# Patient Record
Sex: MALE | NOT HISPANIC OR LATINO | Employment: FULL TIME | URBAN - METROPOLITAN AREA
[De-identification: names, ages, dates, MRNs, and addresses within clinical notes are randomized per-mention and may not be internally consistent; named-entity substitution may affect disease eponyms.]

---

## 2019-08-20 ENCOUNTER — HOSPITAL ENCOUNTER (OUTPATIENT)
Facility: MEDICAL CENTER | Age: 29
End: 2019-08-22
Attending: EMERGENCY MEDICINE | Admitting: INTERNAL MEDICINE
Payer: COMMERCIAL

## 2019-08-20 ENCOUNTER — APPOINTMENT (OUTPATIENT)
Dept: RADIOLOGY | Facility: MEDICAL CENTER | Age: 29
End: 2019-08-20
Attending: EMERGENCY MEDICINE
Payer: COMMERCIAL

## 2019-08-20 DIAGNOSIS — R06.09 DOE (DYSPNEA ON EXERTION): ICD-10-CM

## 2019-08-20 DIAGNOSIS — R07.9 CHEST PAIN WITH MODERATE RISK FOR CARDIAC ETIOLOGY: ICD-10-CM

## 2019-08-20 PROBLEM — E11.9 DIABETES (HCC): Status: ACTIVE | Noted: 2019-08-20

## 2019-08-20 PROBLEM — I10 HYPERTENSION: Status: ACTIVE | Noted: 2019-08-20

## 2019-08-20 PROBLEM — J45.909 ASTHMA: Status: ACTIVE | Noted: 2019-08-20

## 2019-08-20 PROBLEM — E78.00 HIGH CHOLESTEROL: Status: ACTIVE | Noted: 2019-08-20

## 2019-08-20 LAB
ALBUMIN SERPL BCP-MCNC: 4.4 G/DL (ref 3.2–4.9)
ALBUMIN/GLOB SERPL: 1.5 G/DL
ALP SERPL-CCNC: 42 U/L (ref 30–99)
ALT SERPL-CCNC: 19 U/L (ref 2–50)
ANION GAP SERPL CALC-SCNC: 10 MMOL/L (ref 0–11.9)
AST SERPL-CCNC: 17 U/L (ref 12–45)
BASOPHILS # BLD AUTO: 0.4 % (ref 0–1.8)
BASOPHILS # BLD: 0.03 K/UL (ref 0–0.12)
BILIRUB SERPL-MCNC: 0.5 MG/DL (ref 0.1–1.5)
BUN SERPL-MCNC: 15 MG/DL (ref 8–22)
CALCIUM SERPL-MCNC: 9.7 MG/DL (ref 8.5–10.5)
CHLORIDE SERPL-SCNC: 104 MMOL/L (ref 96–112)
CO2 SERPL-SCNC: 23 MMOL/L (ref 20–33)
CREAT SERPL-MCNC: 0.99 MG/DL (ref 0.5–1.4)
EKG IMPRESSION: NORMAL
EOSINOPHIL # BLD AUTO: 0.05 K/UL (ref 0–0.51)
EOSINOPHIL NFR BLD: 0.6 % (ref 0–6.9)
ERYTHROCYTE [DISTWIDTH] IN BLOOD BY AUTOMATED COUNT: 41 FL (ref 35.9–50)
GLOBULIN SER CALC-MCNC: 2.9 G/DL (ref 1.9–3.5)
GLUCOSE BLD-MCNC: 108 MG/DL (ref 65–99)
GLUCOSE BLD-MCNC: 123 MG/DL (ref 65–99)
GLUCOSE SERPL-MCNC: 113 MG/DL (ref 65–99)
HCT VFR BLD AUTO: 47.5 % (ref 42–52)
HGB BLD-MCNC: 15.9 G/DL (ref 14–18)
IMM GRANULOCYTES # BLD AUTO: 0.02 K/UL (ref 0–0.11)
IMM GRANULOCYTES NFR BLD AUTO: 0.2 % (ref 0–0.9)
LYMPHOCYTES # BLD AUTO: 2.83 K/UL (ref 1–4.8)
LYMPHOCYTES NFR BLD: 33.5 % (ref 22–41)
MCH RBC QN AUTO: 31.1 PG (ref 27–33)
MCHC RBC AUTO-ENTMCNC: 33.5 G/DL (ref 33.7–35.3)
MCV RBC AUTO: 92.8 FL (ref 81.4–97.8)
MONOCYTES # BLD AUTO: 0.37 K/UL (ref 0–0.85)
MONOCYTES NFR BLD AUTO: 4.4 % (ref 0–13.4)
NEUTROPHILS # BLD AUTO: 5.14 K/UL (ref 1.82–7.42)
NEUTROPHILS NFR BLD: 60.9 % (ref 44–72)
NRBC # BLD AUTO: 0 K/UL
NRBC BLD-RTO: 0 /100 WBC
PLATELET # BLD AUTO: 210 K/UL (ref 164–446)
PMV BLD AUTO: 10.2 FL (ref 9–12.9)
POTASSIUM SERPL-SCNC: 3.8 MMOL/L (ref 3.6–5.5)
PROT SERPL-MCNC: 7.3 G/DL (ref 6–8.2)
RBC # BLD AUTO: 5.12 M/UL (ref 4.7–6.1)
SODIUM SERPL-SCNC: 137 MMOL/L (ref 135–145)
TROPONIN T SERPL-MCNC: <6 NG/L (ref 6–19)
WBC # BLD AUTO: 8.4 K/UL (ref 4.8–10.8)

## 2019-08-20 PROCEDURE — G0378 HOSPITAL OBSERVATION PER HR: HCPCS

## 2019-08-20 PROCEDURE — 71045 X-RAY EXAM CHEST 1 VIEW: CPT

## 2019-08-20 PROCEDURE — 99285 EMERGENCY DEPT VISIT HI MDM: CPT

## 2019-08-20 PROCEDURE — 93005 ELECTROCARDIOGRAM TRACING: CPT | Performed by: EMERGENCY MEDICINE

## 2019-08-20 PROCEDURE — 80053 COMPREHEN METABOLIC PANEL: CPT

## 2019-08-20 PROCEDURE — 85025 COMPLETE CBC W/AUTO DIFF WBC: CPT

## 2019-08-20 PROCEDURE — 93005 ELECTROCARDIOGRAM TRACING: CPT

## 2019-08-20 PROCEDURE — 82962 GLUCOSE BLOOD TEST: CPT | Mod: 91

## 2019-08-20 PROCEDURE — 99223 1ST HOSP IP/OBS HIGH 75: CPT | Performed by: INTERNAL MEDICINE

## 2019-08-20 PROCEDURE — 84484 ASSAY OF TROPONIN QUANT: CPT

## 2019-08-20 PROCEDURE — 700102 HCHG RX REV CODE 250 W/ 637 OVERRIDE(OP): Performed by: INTERNAL MEDICINE

## 2019-08-20 PROCEDURE — A9270 NON-COVERED ITEM OR SERVICE: HCPCS | Performed by: INTERNAL MEDICINE

## 2019-08-20 RX ORDER — ENALAPRILAT 1.25 MG/ML
1.25 INJECTION INTRAVENOUS EVERY 6 HOURS PRN
Status: DISCONTINUED | OUTPATIENT
Start: 2019-08-20 | End: 2019-08-22 | Stop reason: HOSPADM

## 2019-08-20 RX ORDER — LISINOPRIL 2.5 MG/1
2.5 TABLET ORAL DAILY
Status: DISCONTINUED | OUTPATIENT
Start: 2019-08-21 | End: 2019-08-22 | Stop reason: HOSPADM

## 2019-08-20 RX ORDER — ATORVASTATIN CALCIUM 10 MG/1
10 TABLET, FILM COATED ORAL DAILY
COMMUNITY

## 2019-08-20 RX ORDER — FLUTICASONE PROPIONATE 110 UG/1
2 AEROSOL, METERED RESPIRATORY (INHALATION) 2 TIMES DAILY
COMMUNITY
End: 2019-11-05

## 2019-08-20 RX ORDER — LISINOPRIL 2.5 MG/1
2.5 TABLET ORAL EVERY MORNING
COMMUNITY

## 2019-08-20 RX ORDER — BISACODYL 10 MG
10 SUPPOSITORY, RECTAL RECTAL
Status: DISCONTINUED | OUTPATIENT
Start: 2019-08-20 | End: 2019-08-22 | Stop reason: HOSPADM

## 2019-08-20 RX ORDER — AMOXICILLIN 250 MG
2 CAPSULE ORAL 2 TIMES DAILY
Status: DISCONTINUED | OUTPATIENT
Start: 2019-08-20 | End: 2019-08-22 | Stop reason: HOSPADM

## 2019-08-20 RX ORDER — ALOGLIPTIN 25 MG/1
25 TABLET, FILM COATED ORAL EVERY MORNING
COMMUNITY

## 2019-08-20 RX ORDER — PIOGLITAZONEHYDROCHLORIDE 45 MG/1
45 TABLET ORAL EVERY MORNING
COMMUNITY

## 2019-08-20 RX ORDER — POLYETHYLENE GLYCOL 3350 17 G/17G
1 POWDER, FOR SOLUTION ORAL
Status: DISCONTINUED | OUTPATIENT
Start: 2019-08-20 | End: 2019-08-22 | Stop reason: HOSPADM

## 2019-08-20 RX ORDER — ACETAMINOPHEN 325 MG/1
650 TABLET ORAL EVERY 6 HOURS PRN
Status: DISCONTINUED | OUTPATIENT
Start: 2019-08-20 | End: 2019-08-22 | Stop reason: HOSPADM

## 2019-08-20 RX ORDER — ALBUTEROL SULFATE 90 UG/1
2 AEROSOL, METERED RESPIRATORY (INHALATION) EVERY 4 HOURS PRN
Status: ON HOLD | COMMUNITY
End: 2019-08-22

## 2019-08-20 RX ORDER — ATORVASTATIN CALCIUM 10 MG/1
10 TABLET, FILM COATED ORAL DAILY
Status: DISCONTINUED | OUTPATIENT
Start: 2019-08-21 | End: 2019-08-22 | Stop reason: HOSPADM

## 2019-08-20 RX ADMIN — ACETAMINOPHEN 650 MG: 325 TABLET ORAL at 23:14

## 2019-08-20 SDOH — HEALTH STABILITY: MENTAL HEALTH: HOW OFTEN DO YOU HAVE A DRINK CONTAINING ALCOHOL?: 2-4 TIMES A MONTH

## 2019-08-20 ASSESSMENT — ENCOUNTER SYMPTOMS
PALPITATIONS: 0
FALLS: 0
CHILLS: 0
WEAKNESS: 0
SHORTNESS OF BREATH: 1
TINGLING: 0
SPUTUM PRODUCTION: 0
MYALGIAS: 0
DIZZINESS: 0
HEADACHES: 1
NAUSEA: 0
CONSTIPATION: 0
FEVER: 0
STRIDOR: 0
DEPRESSION: 0
LOSS OF CONSCIOUSNESS: 0
VOMITING: 0
ABDOMINAL PAIN: 0
COUGH: 1
DIARRHEA: 0

## 2019-08-20 ASSESSMENT — LIFESTYLE VARIABLES
ON A TYPICAL DAY WHEN YOU DRINK ALCOHOL HOW MANY DRINKS DO YOU HAVE: 0
ALCOHOL_USE: NO
HAVE YOU EVER FELT YOU SHOULD CUT DOWN ON YOUR DRINKING: NO
HAVE PEOPLE ANNOYED YOU BY CRITICIZING YOUR DRINKING: NO
CONSUMPTION TOTAL: NEGATIVE
DOES PATIENT WANT TO STOP DRINKING: NO
TOTAL SCORE: 0
EVER FELT BAD OR GUILTY ABOUT YOUR DRINKING: NO
TOTAL SCORE: 0
EVER HAD A DRINK FIRST THING IN THE MORNING TO STEADY YOUR NERVES TO GET RID OF A HANGOVER: NO
HOW MANY TIMES IN THE PAST YEAR HAVE YOU HAD 5 OR MORE DRINKS IN A DAY: 0
TOTAL SCORE: 0
EVER_SMOKED: NEVER
AVERAGE NUMBER OF DAYS PER WEEK YOU HAVE A DRINK CONTAINING ALCOHOL: 0

## 2019-08-20 ASSESSMENT — PATIENT HEALTH QUESTIONNAIRE - PHQ9
SUM OF ALL RESPONSES TO PHQ9 QUESTIONS 1 AND 2: 0
2. FEELING DOWN, DEPRESSED, IRRITABLE, OR HOPELESS: NOT AT ALL
1. LITTLE INTEREST OR PLEASURE IN DOING THINGS: NOT AT ALL

## 2019-08-20 NOTE — ED TRIAGE NOTES
"Chief Complaint   Patient presents with   • Epigastric Pain     radiating throughout chest and down LT arm x4 days with SOB.    • Shortness of Breath     worse with exertion. hx of asthma, getting tested next week for gastroparesis.      Pt to triage for above. Pt mildly anxious. EKG completed. NAD noted.   Protocol ordered.     Pt returned to [facility]. Educated on triage process and to inform staff of any changes.     /76   Pulse 92   Temp 36.6 °C (97.9 °F) (Temporal)   Resp 16   Ht 1.753 m (5' 9\")   Wt 106.6 kg (235 lb 0.2 oz)   SpO2 95%   BMI 34.71 kg/m²     "

## 2019-08-20 NOTE — ED NOTES
Pt to desk feeling more discomfort and chest pain. vitaled and placed on 2 liters of 02 for comfort.

## 2019-08-21 ENCOUNTER — APPOINTMENT (OUTPATIENT)
Dept: RADIOLOGY | Facility: MEDICAL CENTER | Age: 29
End: 2019-08-21
Attending: NURSE PRACTITIONER
Payer: COMMERCIAL

## 2019-08-21 ENCOUNTER — PATIENT OUTREACH (OUTPATIENT)
Dept: HEALTH INFORMATION MANAGEMENT | Facility: OTHER | Age: 29
End: 2019-08-21

## 2019-08-21 ENCOUNTER — APPOINTMENT (OUTPATIENT)
Dept: CARDIOLOGY | Facility: MEDICAL CENTER | Age: 29
End: 2019-08-21
Attending: INTERNAL MEDICINE
Payer: COMMERCIAL

## 2019-08-21 PROBLEM — E78.5 DYSLIPIDEMIA: Status: ACTIVE | Noted: 2019-08-20

## 2019-08-21 PROBLEM — E11.9 DIABETES (HCC): Chronic | Status: ACTIVE | Noted: 2019-08-20

## 2019-08-21 LAB
ANION GAP SERPL CALC-SCNC: 10 MMOL/L (ref 0–11.9)
BUN SERPL-MCNC: 15 MG/DL (ref 8–22)
CALCIUM SERPL-MCNC: 9.5 MG/DL (ref 8.5–10.5)
CHLORIDE SERPL-SCNC: 105 MMOL/L (ref 96–112)
CO2 SERPL-SCNC: 22 MMOL/L (ref 20–33)
CREAT SERPL-MCNC: 1.03 MG/DL (ref 0.5–1.4)
D DIMER PPP IA.FEU-MCNC: <0.4 UG/ML (FEU) (ref 0–0.5)
ERYTHROCYTE [DISTWIDTH] IN BLOOD BY AUTOMATED COUNT: 41.1 FL (ref 35.9–50)
GLUCOSE BLD-MCNC: 142 MG/DL (ref 65–99)
GLUCOSE BLD-MCNC: 218 MG/DL (ref 65–99)
GLUCOSE BLD-MCNC: 237 MG/DL (ref 65–99)
GLUCOSE BLD-MCNC: 308 MG/DL (ref 65–99)
GLUCOSE SERPL-MCNC: 216 MG/DL (ref 65–99)
HCT VFR BLD AUTO: 44.9 % (ref 42–52)
HGB BLD-MCNC: 15.2 G/DL (ref 14–18)
LV EJECT FRACT  99904: 60
LV EJECT FRACT MOD 2C 99903: 62.88
LV EJECT FRACT MOD 4C 99902: 70.17
LV EJECT FRACT MOD BP 99901: 69.56
MCH RBC QN AUTO: 31.6 PG (ref 27–33)
MCHC RBC AUTO-ENTMCNC: 33.9 G/DL (ref 33.7–35.3)
MCV RBC AUTO: 93.3 FL (ref 81.4–97.8)
PLATELET # BLD AUTO: 207 K/UL (ref 164–446)
PMV BLD AUTO: 10.3 FL (ref 9–12.9)
POTASSIUM SERPL-SCNC: 3.7 MMOL/L (ref 3.6–5.5)
RBC # BLD AUTO: 4.81 M/UL (ref 4.7–6.1)
SODIUM SERPL-SCNC: 137 MMOL/L (ref 135–145)
TROPONIN T SERPL-MCNC: <6 NG/L (ref 6–19)
WBC # BLD AUTO: 8.4 K/UL (ref 4.8–10.8)

## 2019-08-21 PROCEDURE — 700102 HCHG RX REV CODE 250 W/ 637 OVERRIDE(OP): Performed by: STUDENT IN AN ORGANIZED HEALTH CARE EDUCATION/TRAINING PROGRAM

## 2019-08-21 PROCEDURE — 36415 COLL VENOUS BLD VENIPUNCTURE: CPT

## 2019-08-21 PROCEDURE — A9270 NON-COVERED ITEM OR SERVICE: HCPCS | Performed by: INTERNAL MEDICINE

## 2019-08-21 PROCEDURE — 84484 ASSAY OF TROPONIN QUANT: CPT | Mod: 91

## 2019-08-21 PROCEDURE — 85027 COMPLETE CBC AUTOMATED: CPT

## 2019-08-21 PROCEDURE — 700117 HCHG RX CONTRAST REV CODE 255: Performed by: NURSE PRACTITIONER

## 2019-08-21 PROCEDURE — 93306 TTE W/DOPPLER COMPLETE: CPT | Mod: 26 | Performed by: INTERNAL MEDICINE

## 2019-08-21 PROCEDURE — G0378 HOSPITAL OBSERVATION PER HR: HCPCS

## 2019-08-21 PROCEDURE — 85379 FIBRIN DEGRADATION QUANT: CPT

## 2019-08-21 PROCEDURE — 700102 HCHG RX REV CODE 250 W/ 637 OVERRIDE(OP): Performed by: NURSE PRACTITIONER

## 2019-08-21 PROCEDURE — 96374 THER/PROPH/DIAG INJ IV PUSH: CPT

## 2019-08-21 PROCEDURE — 700111 HCHG RX REV CODE 636 W/ 250 OVERRIDE (IP): Performed by: INTERNAL MEDICINE

## 2019-08-21 PROCEDURE — 306588 SLEEVE,VASO CALF MED: Performed by: INTERNAL MEDICINE

## 2019-08-21 PROCEDURE — 82962 GLUCOSE BLOOD TEST: CPT | Mod: 91

## 2019-08-21 PROCEDURE — 700102 HCHG RX REV CODE 250 W/ 637 OVERRIDE(OP): Performed by: INTERNAL MEDICINE

## 2019-08-21 PROCEDURE — A9270 NON-COVERED ITEM OR SERVICE: HCPCS

## 2019-08-21 PROCEDURE — 99233 SBSQ HOSP IP/OBS HIGH 50: CPT | Performed by: INTERNAL MEDICINE

## 2019-08-21 PROCEDURE — A9270 NON-COVERED ITEM OR SERVICE: HCPCS | Performed by: NURSE PRACTITIONER

## 2019-08-21 PROCEDURE — 96372 THER/PROPH/DIAG INJ SC/IM: CPT

## 2019-08-21 PROCEDURE — 93010 ELECTROCARDIOGRAM REPORT: CPT | Mod: 76 | Performed by: INTERNAL MEDICINE

## 2019-08-21 PROCEDURE — 93005 ELECTROCARDIOGRAM TRACING: CPT | Performed by: INTERNAL MEDICINE

## 2019-08-21 PROCEDURE — 94640 AIRWAY INHALATION TREATMENT: CPT

## 2019-08-21 PROCEDURE — 700102 HCHG RX REV CODE 250 W/ 637 OVERRIDE(OP)

## 2019-08-21 PROCEDURE — 93010 ELECTROCARDIOGRAM REPORT: CPT | Performed by: INTERNAL MEDICINE

## 2019-08-21 PROCEDURE — 99253 IP/OBS CNSLTJ NEW/EST LOW 45: CPT | Mod: GC | Performed by: INTERNAL MEDICINE

## 2019-08-21 PROCEDURE — 93306 TTE W/DOPPLER COMPLETE: CPT

## 2019-08-21 PROCEDURE — 700111 HCHG RX REV CODE 636 W/ 250 OVERRIDE (IP): Performed by: NURSE PRACTITIONER

## 2019-08-21 PROCEDURE — 700101 HCHG RX REV CODE 250: Performed by: NURSE PRACTITIONER

## 2019-08-21 PROCEDURE — 71275 CT ANGIOGRAPHY CHEST: CPT

## 2019-08-21 PROCEDURE — A9270 NON-COVERED ITEM OR SERVICE: HCPCS | Performed by: STUDENT IN AN ORGANIZED HEALTH CARE EDUCATION/TRAINING PROGRAM

## 2019-08-21 PROCEDURE — 80048 BASIC METABOLIC PNL TOTAL CA: CPT

## 2019-08-21 RX ORDER — PREDNISONE 10 MG/1
10 TABLET ORAL DAILY
Status: DISCONTINUED | OUTPATIENT
Start: 2019-08-30 | End: 2019-08-22 | Stop reason: HOSPADM

## 2019-08-21 RX ORDER — PREDNISONE 10 MG/1
5 TABLET ORAL DAILY
Status: DISCONTINUED | OUTPATIENT
Start: 2019-09-02 | End: 2019-08-22 | Stop reason: HOSPADM

## 2019-08-21 RX ORDER — MONTELUKAST SODIUM 10 MG/1
10 TABLET ORAL DAILY
Status: DISCONTINUED | OUTPATIENT
Start: 2019-08-21 | End: 2019-08-22 | Stop reason: HOSPADM

## 2019-08-21 RX ORDER — CETIRIZINE HYDROCHLORIDE 10 MG/1
10 TABLET ORAL DAILY
Status: DISCONTINUED | OUTPATIENT
Start: 2019-08-21 | End: 2019-08-22 | Stop reason: HOSPADM

## 2019-08-21 RX ORDER — BUDESONIDE AND FORMOTEROL FUMARATE DIHYDRATE 160; 4.5 UG/1; UG/1
2 AEROSOL RESPIRATORY (INHALATION)
Status: DISCONTINUED | OUTPATIENT
Start: 2019-08-21 | End: 2019-08-22 | Stop reason: HOSPADM

## 2019-08-21 RX ORDER — NITROGLYCERIN 0.4 MG/1
TABLET SUBLINGUAL
Status: COMPLETED
Start: 2019-08-21 | End: 2019-08-21

## 2019-08-21 RX ORDER — MORPHINE SULFATE 4 MG/ML
1-4 INJECTION, SOLUTION INTRAMUSCULAR; INTRAVENOUS EVERY 4 HOURS PRN
Status: DISCONTINUED | OUTPATIENT
Start: 2019-08-21 | End: 2019-08-22 | Stop reason: HOSPADM

## 2019-08-21 RX ORDER — AZITHROMYCIN 250 MG/1
500 TABLET, FILM COATED ORAL DAILY
Status: DISCONTINUED | OUTPATIENT
Start: 2019-08-21 | End: 2019-08-22 | Stop reason: HOSPADM

## 2019-08-21 RX ORDER — FAMOTIDINE 20 MG/1
20 TABLET, FILM COATED ORAL 2 TIMES DAILY
Status: DISCONTINUED | OUTPATIENT
Start: 2019-08-21 | End: 2019-08-22 | Stop reason: HOSPADM

## 2019-08-21 RX ORDER — PREDNISONE 20 MG/1
20 TABLET ORAL DAILY
Status: DISCONTINUED | OUTPATIENT
Start: 2019-08-27 | End: 2019-08-22 | Stop reason: HOSPADM

## 2019-08-21 RX ORDER — PREDNISONE 20 MG/1
40 TABLET ORAL DAILY
Status: DISCONTINUED | OUTPATIENT
Start: 2019-08-21 | End: 2019-08-22 | Stop reason: HOSPADM

## 2019-08-21 RX ORDER — NITROGLYCERIN 0.4 MG/1
0.4 TABLET SUBLINGUAL ONCE
Status: COMPLETED | OUTPATIENT
Start: 2019-08-21 | End: 2019-08-21

## 2019-08-21 RX ORDER — ALBUTEROL SULFATE 90 UG/1
2 AEROSOL, METERED RESPIRATORY (INHALATION)
Status: DISCONTINUED | OUTPATIENT
Start: 2019-08-21 | End: 2019-08-22 | Stop reason: HOSPADM

## 2019-08-21 RX ADMIN — ALBUTEROL SULFATE 2.5 MG: 2.5 SOLUTION RESPIRATORY (INHALATION) at 10:17

## 2019-08-21 RX ADMIN — CETIRIZINE HYDROCHLORIDE 10 MG: 10 TABLET, FILM COATED ORAL at 11:49

## 2019-08-21 RX ADMIN — MONTELUKAST 10 MG: 10 TABLET, FILM COATED ORAL at 11:49

## 2019-08-21 RX ADMIN — PREDNISONE 40 MG: 20 TABLET ORAL at 11:49

## 2019-08-21 RX ADMIN — LISINOPRIL 2.5 MG: 2.5 TABLET ORAL at 06:59

## 2019-08-21 RX ADMIN — AZITHROMYCIN 500 MG: 250 TABLET, FILM COATED ORAL at 11:49

## 2019-08-21 RX ADMIN — MORPHINE SULFATE 2 MG: 4 INJECTION INTRAVENOUS at 07:15

## 2019-08-21 RX ADMIN — ALBUTEROL SULFATE 2.5 MG: 2.5 SOLUTION RESPIRATORY (INHALATION) at 15:59

## 2019-08-21 RX ADMIN — IOHEXOL 60 ML: 350 INJECTION, SOLUTION INTRAVENOUS at 13:15

## 2019-08-21 RX ADMIN — ATORVASTATIN CALCIUM 10 MG: 10 TABLET, FILM COATED ORAL at 06:59

## 2019-08-21 RX ADMIN — BUDESONIDE AND FORMOTEROL FUMARATE DIHYDRATE 2 PUFF: 160; 4.5 AEROSOL RESPIRATORY (INHALATION) at 17:30

## 2019-08-21 RX ADMIN — SENNOSIDES, DOCUSATE SODIUM 2 TABLET: 50; 8.6 TABLET, FILM COATED ORAL at 18:24

## 2019-08-21 RX ADMIN — NITROGLYCERIN 0.4 MG: 0.4 TABLET, ORALLY DISINTEGRATING SUBLINGUAL at 11:15

## 2019-08-21 RX ADMIN — INSULIN HUMAN 3 UNITS: 100 INJECTION, SOLUTION PARENTERAL at 21:31

## 2019-08-21 RX ADMIN — FAMOTIDINE 20 MG: 20 TABLET ORAL at 11:49

## 2019-08-21 RX ADMIN — ALBUTEROL SULFATE 2.5 MG: 2.5 SOLUTION RESPIRATORY (INHALATION) at 19:24

## 2019-08-21 RX ADMIN — INSULIN HUMAN 6 UNITS: 100 INJECTION, SOLUTION PARENTERAL at 18:29

## 2019-08-21 RX ADMIN — INSULIN HUMAN 3 UNITS: 100 INJECTION, SOLUTION PARENTERAL at 13:25

## 2019-08-21 RX ADMIN — FAMOTIDINE 20 MG: 20 TABLET ORAL at 18:24

## 2019-08-21 RX ADMIN — NITROGLYCERIN 0.4 MG: 0.4 TABLET SUBLINGUAL at 11:15

## 2019-08-21 ASSESSMENT — ENCOUNTER SYMPTOMS
VOMITING: 0
ORTHOPNEA: 0
COUGH: 0
SHORTNESS OF BREATH: 1
MYALGIAS: 0
MEMORY LOSS: 0
WHEEZING: 0
BLURRED VISION: 0
CONSTIPATION: 0
SPEECH CHANGE: 0
HEADACHES: 0
FALLS: 0
DOUBLE VISION: 0
PALPITATIONS: 0
NAUSEA: 0
NERVOUS/ANXIOUS: 0
SINUS PAIN: 0
CHILLS: 0
COUGH: 1
ABDOMINAL PAIN: 0
DIZZINESS: 1
SPUTUM PRODUCTION: 0
FEVER: 0
FOCAL WEAKNESS: 0
DIZZINESS: 0
DIARRHEA: 0
SORE THROAT: 0

## 2019-08-21 ASSESSMENT — LIFESTYLE VARIABLES: SUBSTANCE_ABUSE: 0

## 2019-08-21 NOTE — PROGRESS NOTES
Patient woke up with 8/10 sharp chest pain.  EKG and trop ordered.  MD Dr. Lu paged to update on patient status.  Waiting for call back.

## 2019-08-21 NOTE — ASSESSMENT & PLAN NOTE
-history of  -now with likely exacerbation with hypoxia requiring O2  -I've started him on steroid taper, Singulair, Zyrtec and Pepcid  -Lungs are without wheezes at baseline, however with activity he is noted to have upper airway/lobe expiratory wheezes  -Nebulizers every 4  -No acute exacerbation  -Pulmonary consulted - Dr. Morris

## 2019-08-21 NOTE — FLOWSHEET NOTE
Respiratory Therapy Update    Interdisciplinary Plan of Care-Goals (Indications)  Bronchodilator Indications: History / Diagnosis;Physical Exam / Hyperinflation / Wheezing (bronchospasm);Strong Subjective / Objective Improvement (08/21/19 1555)  Interdisciplinary Plan of Care-Outcomes   Bronchodilator Outcome: Diminished Wheezing and Volume of Air Movement Increased;Patient at Stable Baseline (08/21/19 1555)          #SVN Performed: Yes (08/21/19 1555)                                O2 (LPM): 2 (08/21/19 1555)  O2 Daily Delivery Respiratory : Silicone Nasal Cannula (08/21/19 1555)    Breath Sounds  Pre/Post Intervention: Pre Intervention Assessment (08/21/19 1555)  RUL Breath Sounds: Clear;Diminished (08/21/19 1555)  RML Breath Sounds: Diminished (08/21/19 1555)  RLL Breath Sounds: Diminished (08/21/19 1555)  CANDICE Breath Sounds: Diminished;Expiratory Wheezes (08/21/19 1555)  LLL Breath Sounds: Diminished (08/21/19 1555)        Events/Summary/Plan: SVN done in high fowlers (08/21/19 1555)

## 2019-08-21 NOTE — PROGRESS NOTES
Patient transferred to floor with ACLS RN and telemetry monitored. Patient is A/O x4 and co exertional lateral rib cage.

## 2019-08-21 NOTE — PROGRESS NOTES
"Hospital Medicine Daily Progress Note    Date of Service  8/21/2019    Chief Complaint  Chest pain and SOB    Hospital Course   This is a 29-year-old male with PMH diabetes diagnosed 6 years ago, hyperlipidemia and asthma who presented 8/20/2019 with shortness of breath.  This has been going on for \"a while now\", unsure exact duration but he has been seen at hospital in [place] multiple times and told to take his inhaler as needed, however he has not gotten any improvement.  Please see full H&P by Dr. Lu.  Troponin trended and were negative.  Initial twelve-lead EKG showed no acute changes, he had episode of chest pain morning after presentation which showed concern for right heart strain.  D-dimer was negative.  CTA was negative for PE.     Interval Problem Update  -[place] 2015 - stress test/ECHO - told he had MVP. ECHO here showed no acute findings.  -Episode of chest pain today.  Troponin negative.  EKG suspicious for right heart strain.  Negative d-dimer. CTA negative for PE.  Plan for nuclear medicine stress test  -Significant dyspnea with ambulation with associated dizziness.  He reports supplemental O2 is very effective for his symptoms.    Consultants/Specialty  -Pulmonary    Code Status  FULL    Disposition  TBD    Review of Systems  Review of Systems   Constitutional: Positive for malaise/fatigue.   Eyes: Negative for blurred vision and double vision.   Respiratory: Positive for cough and shortness of breath. Negative for sputum production and wheezing.    Cardiovascular: Positive for chest pain. Negative for palpitations and leg swelling.   Gastrointestinal: Negative for abdominal pain, constipation, diarrhea, nausea and vomiting.   Genitourinary: Negative for dysuria, frequency and urgency.   Musculoskeletal: Negative for falls, joint pain and myalgias.   Neurological: Positive for dizziness. Negative for speech change, focal weakness and headaches.   Psychiatric/Behavioral: Negative for memory " loss and substance abuse.   All other systems reviewed and are negative.       Physical Exam  Temp:  [36.1 °C (97 °F)-36.6 °C (97.9 °F)] 36.2 °C (97.1 °F)  Pulse:  [63-88] 72  Resp:  [17-20] 18  BP: (104-135)/(62-80) 135/79  SpO2:  [93 %-99 %] 96 %    Physical Exam   Constitutional: He is oriented to person, place, and time. Vital signs are normal. He appears well-developed and well-nourished. He is cooperative. No distress. Nasal cannula in place.   HENT:   Head: Normocephalic.   Right Ear: Hearing normal.   Left Ear: Hearing normal.   Nose: Nose normal.   Mouth/Throat: Oropharynx is clear and moist and mucous membranes are normal.   Eyes: Conjunctivae are normal. No scleral icterus.   Neck: Phonation normal. JVD: unable to assess due to body habitus.   Cardiovascular: Intact distal pulses. Exam reveals distant heart sounds.   No edema   Pulmonary/Chest: No apnea. He is in respiratory distress (with activity). He has decreased breath sounds.   Abdominal:   Obese, slightly distended, (+) BS   Musculoskeletal: Normal range of motion.   Neurological: He is alert and oriented to person, place, and time. He has normal strength. He displays a negative Romberg sign. GCS eye subscore is 4. GCS verbal subscore is 5. GCS motor subscore is 6.   Skin: Skin is warm and dry. No rash noted.   Psychiatric: He has a normal mood and affect. His behavior is normal. Judgment normal. Cognition and memory are normal.   Nursing note and vitals reviewed.      Fluids  No intake or output data in the 24 hours ending 08/21/19 1632    Laboratory  Recent Labs     08/20/19 2102 08/21/19  0037   WBC 8.4 8.4   RBC 5.12 4.81   HEMOGLOBIN 15.9 15.2   HEMATOCRIT 47.5 44.9   MCV 92.8 93.3   MCH 31.1 31.6   MCHC 33.5* 33.9   RDW 41.0 41.1   PLATELETCT 210 207   MPV 10.2 10.3     Recent Labs     08/20/19 2102 08/21/19  0037   SODIUM 137 137   POTASSIUM 3.8 3.7   CHLORIDE 104 105   CO2 23 22   GLUCOSE 113* 216*   BUN 15 15   CREATININE 0.99 1.03    CALCIUM 9.7 9.5                   Imaging  CT-CTA CHEST PULMONARY ARTERY W/ RECONS   Final Result      No evidence of pulmonary embolus            EC-ECHOCARDIOGRAM COMPLETE W/O CONT   Final Result      DX-CHEST-PORTABLE (1 VIEW)   Final Result         1. No acute cardiopulmonary abnormalities are identified.      NM-CARDIAC STRESS TEST    (Results Pending)        Assessment/Plan  Chest pain- (present on admission)  Assessment & Plan  -pain is reproducible on palpation and located mostly in his left chest.  -Troponin trended and negative.  -Episode of chest pain today.  Twelve-lead EKG suspicious for right heart strain.  CTA negative for PE.  -Echo unremarkable  -Pulmonary consulted and recommends stress test which has been ordered  -ASA and statin  -Transfer to telemetry      Exertional dyspnea- (present on admission)  Assessment & Plan  -Ongoing with associated dizziness  -Symptoms improved with O2  -Negative d-dimer.  Negative CTA for PE  -Echo unremarkable  -Pulmonary consulted  -Stress test pending    Hypertension- (present on admission)  Assessment & Plan  -chronic  -Continue home lisinopril      Dyslipidemia- (present on admission)  Assessment & Plan  -Chronic  -Continue home statin    Diabetes (HCC)- (present on admission)  Assessment & Plan  -Followed by endocrinology as outpatient.  Recently taken off his insulin  -Holding his home medications for now  -Monitor closely for hyperglycemia now that we have started steroids  -Accu-Cheks AC at bedtime with SSI  -Diabetic diet  -Hypoglycemia protocol      Asthma- (present on admission)  Assessment & Plan  -history of  -now with likely exacerbation with hypoxia requiring O2  -I've started him on steroid taper, Singulair, Zyrtec and Pepcid  -Lungs are without wheezes at baseline, however with activity he is noted to have upper airway/lobe expiratory wheezes  -Nebulizers every 4  -No acute exacerbation  -Pulmonary consulted - Dr. Morris       VTE prophylaxis:  Enoxaparin    MARK Aguirre.

## 2019-08-21 NOTE — PROGRESS NOTES
"Bedside report received 0710. POC discussed with pt; Pt c/o increased SOB with exertion; 99% on RA with mild WOB noted; Wife Jess at bedside; Pending echo; Pt updated that has hx of MD ADOLFO aware; Pt states,\" With mild exertion SOB increases\" all questions answered at this time.   "

## 2019-08-21 NOTE — H&P
Hospital Medicine History & Physical Note    Date of Service  8/20/2019    Primary Care Physician  None     Consultants  None    Code Status  Full    Chief Complaint  Exertional dyspnea    History of Presenting Illness  29 y.o. male who presented 8/20/2019 with exertional dyspnea.  Patient states he has been short of breath for a while, he is unable to state exactly how long, has been in the hospital and [place] multiple times.  They just give him an inhaler and tell him to keep using it but that has not improved his condition.  He states now it is severe shortness of breath, he gets short of breath while walking around his house.  On Friday is when it became very severe again.  Patient has had it to the severity in the past, does seem to wax and wane to some degree.  He also complains of a nonproductive cough.  He states he also gets chest pain, he notices it whenever he takes breaths, not even necessarily deep breaths.  Sometimes the pain radiates to the arm.  It is substernal, sharp/stabbing, 10/10 at its worse.  He also has some chest tenderness to palpation but states this is a different pain.  I did discuss the case including labs and imaging with the ER physician.     Review of Systems  Review of Systems   Constitutional: Negative for chills, fever and malaise/fatigue.   HENT: Negative for congestion.    Respiratory: Positive for cough and shortness of breath. Negative for sputum production and stridor.    Cardiovascular: Positive for chest pain. Negative for palpitations and leg swelling.   Gastrointestinal: Negative for abdominal pain, constipation, diarrhea, nausea and vomiting.   Genitourinary: Negative for dysuria and urgency.   Musculoskeletal: Negative for falls and myalgias.   Neurological: Positive for headaches. Negative for dizziness, tingling, loss of consciousness and weakness.   Psychiatric/Behavioral: Negative for depression and suicidal ideas.   All other systems reviewed and are  negative.      Past Medical History   has a past medical history of Asthma, Diabetes (HCC), High cholesterol, and Hypertension.    Surgical History  None    Family History  Diabetes, clotting disorder    Social History   reports that he has never smoked. He has never used smokeless tobacco. He reports that he drinks alcohol. He reports that he has current or past drug history.    Allergies  Allergies   Allergen Reactions   • Asa [Aspirin]      Swollen throat    • Ibuprofen      Swollen throat       Medications  Prior to Admission Medications   Prescriptions Last Dose Informant Patient Reported? Taking?   Alogliptin Benzoate 25 MG Tab 8/20/2019 at 0900  Yes Yes   Sig: Take 25 mg by mouth every morning.   Ertugliflozin L-PyroglutamicAc (STEGLATRO) 15 MG Tab 8/20/2019 at 0900  Yes Yes   Sig: Take 15 mg by mouth every day.   albuterol 108 (90 Base) MCG/ACT Aero Soln inhalation aerosol 8/20/2019 at 1600  Yes Yes   Sig: Inhale 2 Puffs by mouth every four hours as needed for Shortness of Breath.   atorvastatin (LIPITOR) 10 MG Tab 8/20/2019 at 0900  Yes Yes   Sig: Take 10 mg by mouth every day.   fluticasone (FLOVENT HFA) 110 MCG/ACT Aerosol 8/20/2019 at 0900  Yes Yes   Sig: Inhale 2 Puffs by mouth 2 times a day.   lisinopril (PRINIVIL) 2.5 MG Tab 8/20/2019 at 0900  Yes Yes   Sig: Take 2.5 mg by mouth every day.   pioglitazone (ACTOS) 45 MG Tab 8/20/2019 at 0900  Yes Yes   Sig: Take 45 mg by mouth every day.      Facility-Administered Medications: None       Physical Exam  Temp:  [36.4 °C (97.5 °F)-36.6 °C (97.9 °F)] 36.4 °C (97.5 °F)  Pulse:  [80-98] 86  Resp:  [16-24] 18  BP: (104-120)/(70-82) 104/70  SpO2:  [95 %-98 %] 98 %    Physical Exam   Constitutional: He is oriented to person, place, and time. He appears well-developed and well-nourished.  Non-toxic appearance. No distress.   HENT:   Head: Normocephalic and atraumatic. Not macrocephalic and not microcephalic. Head is without raccoon's eyes and without Santana's  sign.   Right Ear: External ear normal.   Left Ear: External ear normal.   Mouth/Throat: Oropharynx is clear and moist. No oropharyngeal exudate.   Eyes: Conjunctivae are normal. Right eye exhibits no discharge. Left eye exhibits no discharge. No scleral icterus.   Neck: Normal range of motion. Neck supple. No tracheal deviation, no edema and no erythema present.   Cardiovascular: Normal rate, regular rhythm, normal heart sounds and intact distal pulses. Exam reveals no gallop, no friction rub and no decreased pulses.   No murmur heard.  Pulmonary/Chest: Effort normal and breath sounds normal. No stridor. No respiratory distress. He has no decreased breath sounds. He has no wheezes. He has no rhonchi. He has no rales. He exhibits tenderness.   Abdominal: Soft. Bowel sounds are normal. He exhibits no distension. There is no splenomegaly or hepatomegaly. There is no tenderness. There is no rebound and no guarding.   Musculoskeletal: Normal range of motion. He exhibits no edema, tenderness or deformity.   Lymphadenopathy:     He has no cervical adenopathy.   Neurological: He is alert and oriented to person, place, and time. No cranial nerve deficit. Coordination normal.   Skin: Skin is warm and dry. No rash noted. He is not diaphoretic. No cyanosis or erythema. No pallor. Nails show no clubbing.   Psychiatric: He has a normal mood and affect. His speech is normal and behavior is normal. Judgment and thought content normal. Cognition and memory are normal.   Nursing note and vitals reviewed.      Laboratory:  Recent Labs     08/20/19 2102   WBC 8.4   RBC 5.12   HEMOGLOBIN 15.9   HEMATOCRIT 47.5   MCV 92.8   MCH 31.1   MCHC 33.5*   RDW 41.0   PLATELETCT 210   MPV 10.2     Recent Labs     08/20/19 2102   SODIUM 137   POTASSIUM 3.8   CHLORIDE 104   CO2 23   GLUCOSE 113*   BUN 15   CREATININE 0.99   CALCIUM 9.7     Recent Labs     08/20/19 2102   ALTSGPT 19   ASTSGOT 17   ALKPHOSPHAT 42   TBILIRUBIN 0.5   GLUCOSE 113*          No results for input(s): NTPROBNP in the last 72 hours.      Recent Labs     08/20/19  2102   TROPONINT <6       Urinalysis:    No results found     Imaging:  DX-CHEST-PORTABLE (1 VIEW)   Final Result         1. No acute cardiopulmonary abnormalities are identified.      EC-ECHOCARDIOGRAM COMPLETE W/O CONT    (Results Pending)         Assessment/Plan:  I anticipate this patient is appropriate for observation status at this time.    Chest pain  Assessment & Plan  -There is a reproducible chest pain but patient states that is not his pain  -Trend troponin  -Obtain a d-dimer  -If d-dimer is markedly elevated, will need to obtain CTA chest  -At this point in time, I do not feel a stress test would be appropriate in his age group, high risk of a false positive  -I did personally review his EKG, noted sinus rhythm with J-point elevation in a couple of the precordial leads  -I did personally review his chest x-ray, no acute cardiopulmonary process    Exertional dyspnea  Assessment & Plan  -Profound, patient gets short of breath just moving around his house  -Obtain d-dimer  -Obtain echocardiogram    Hypertension  Assessment & Plan  -Continue home lisinopril  -Place PRN enalapril  -Adjust as needed    High cholesterol  Assessment & Plan  -Continue home statin    Diabetes (HCC)  Assessment & Plan  -Hold home medications  -Start insulin sliding scale  -Adjust as needed    Asthma  Assessment & Plan  -No acute exacerbation  -Start respiratory care per protocol  -Patient does have profound exertional dyspnea but his breath sounds were normal, I do not think it is asthma      VTE prophylaxis: Lovenox

## 2019-08-21 NOTE — FLOWSHEET NOTE
Respiratory Therapy Update    Interdisciplinary Plan of Care-Goals (Indications)  Bronchodilator Indications: History / Diagnosis;Physical Exam / Hyperinflation / Wheezing (bronchospasm);Strong Subjective / Objective Improvement (08/21/19 1018)  Interdisciplinary Plan of Care-Outcomes   Bronchodilator Outcome: Diminished Wheezing and Volume of Air Movement Increased;Patient at Stable Baseline (08/21/19 1018)          #SVN Performed: Yes (08/21/19 1018)                                O2 (LPM): 2 (08/21/19 1018)  O2 Daily Delivery Respiratory : Silicone Nasal Cannula (08/21/19 1018)    Breath Sounds  Pre/Post Intervention: Pre Intervention Assessment (08/21/19 1018)  RUL Breath Sounds: Expiratory Wheezes (08/21/19 1018)  RML Breath Sounds: Diminished (08/21/19 1018)  RLL Breath Sounds: Diminished (08/21/19 1018)  CANDICE Breath Sounds: Expiratory Wheezes (08/21/19 1018)  LLL Breath Sounds: Diminished (08/21/19 1018)      Events/Summary/Plan: Initial neb done.  Pt. alert and tolerating tx well. (08/21/19 1018)

## 2019-08-21 NOTE — DISCHARGE PLANNING
Care Transition Team Assessment    Spoke with patient at bedside. Anticipate no needs @ present time. Will drive self home @ D/C.    Information Source  Orientation : Oriented x 4  Information Given By: Patient    Readmission Evaluation  Is this a readmission?: No    Interdisciplinary Discharge Planning  Does Admitting Nurse Feel This Could be a Complex Discharge?: No  Primary Care Physician: Alisia  Lives with - Patient's Self Care Capacity: Spouse  Patient or legal guardian wants to designate a caregiver (see row info): No  Support Systems: Spouse / Significant Other  Housing / Facility: [address]  Do You Take your Prescribed Medications Regularly: Yes  Able to Return to Previous ADL's: Yes  Mobility Issues: No  Prior Services: Home-Independent  Patient Expects to be Discharged to:: Home  Assistance Needed: No  Durable Medical Equipment: Not Applicable    Discharge Preparedness  What are your discharge supports?: Spouse  Prior Functional Level: Ambulatory    Functional Assesment  Prior Functional Level: Ambulatory    Finances  Prescription Coverage: Yes    Anticipated Discharge Information  Anticipated discharge disposition: Home  Discharge Address: [address] [place] [address]  Discharge Contact Phone Number: [phone]

## 2019-08-21 NOTE — CARE PLAN
Problem: Communication  Goal: The ability to communicate needs accurately and effectively will improve  Outcome: PROGRESSING AS EXPECTED     Problem: Safety  Goal: Will remain free from injury  Outcome: PROGRESSING AS EXPECTED     Call light education provide, patient completed return demonstration successfully. Re-enforced use of call light to ensure patient safety and decrease risk of fall.

## 2019-08-21 NOTE — PROGRESS NOTES
Assumed care of patient from Prieto CARBONE.  All questions and concerns answered at this time for patient.  Call light within reach. Wife at bedside.

## 2019-08-21 NOTE — ED PROVIDER NOTES
ED Provider Note    HPI: Patient is a 29-year-old male who presented to the emergency department August 20, 2019 at 2:49 PM with a chief complaint of exertional shortness of breath and chest pain.    Patient began having the symptoms a few days ago.  He has intermittent shortness of breath worse with exertion and he also notes pain radiating throughout his chest and down his left arm associated with shortness of breath.  The patient is diabetic.  No fever no chills no cough.  Patient has chronic upper abdominal pain and is being tested for gastroparesis.  No change in bladder habits.  Pain does not go into his neck or his back.  It is not especially positional in it.  No other somatic complaints    Review of Systems: Positive for substernal chest pain radiates in the left arm dyspnea on exertion chronic upper abdominal pain negative for change in bladder habits pain in the neck or back fever chills cough.  Review of systems reviewed with patient, all other systems negative    Past medical/surgical history: Diabetes high cholesterol hypertension asthma    Medications: pioglitazone alogliptin, steglatro, albuterol atorvastatin lisinopril Reglan    Allergies: Aspirin ibuprofen    Social History: No history of smoking social use of alcohol      Physical exam: Constitutional: Pleasant male awake alert  Vital signs: Temperature 97.5 pulse 98 respirations 24 blood pressure 109/82 pulse oximetry 95%  EYES: PERRL, EOMI, Conjunctivae and sclera normal, eyelids normal bilaterally.  Neck: Trachea midline. No cervical masses seen or palpated. Normal range of motion, supple. No meningeal signs elicited.  Cardiac: Regular rate and rhythm. S1-S2 present. No S3 or S4 present. No murmurs, rubs, or gallops heard. No edema or varicosities were seen.   Lungs: Clear to auscultation with good aeration throughout. No wheezes, rales, or rhonchi heard. Patient's chest wall moved symmetrically with each respiratory effort. Patient was not  making use of accessory muscles of respiration in breathing.  Abdomen: Soft nontender to palpation.  Subjective pain in the epigastric area is not increased with palpation no rebound or guarding elicited. No organomegaly identified. No pulsatile abdominal masses identified.   Musculoskeletal:  no  pain with palpitation or movement of muscle, bone or joint , no obvious musculoskeletal deformities identified.  Neurologic: alert and awake answers questions appropriately. Moves all four extremities independently, no gross focal abnormalities identified. Normal strength and motor.  Skin: no rash or lesion seen, no palpable dermatologic lesions identified.  Psychiatric: Patient appeared to be slightly anxious.  He was not delusional or hallucinating    Medical decision making: EKG obtained on arrival (interpretation) twelve-lead EKGSinus rhythm rate 87.  Morphology P waves QRS complexes T waves unremarkable.  No evidence of ST elevation or depression.  R wave progression normal.  Interpreted as an unremarkable EKG for acute finding    Chest x-ray obtained; no acute abnormalities were seen.    Laboratory studies obtained (please see lab sheet for all results) significant findings included unremarkable CBC blood sugar slightly elevated 123.  Troponin was normal.    Patient be admitted for further evaluation by the hospitalist service.  While it is encouraging his studies are thus far unrevealing his signs and symptoms are certainly concerning for angina or a primary cardiac issue.  I think an echocardiogram is probably indicated given his significant dyspnea on exertion.  Given his history of diabetes and hypertension I would characterize his pain and shortness of breath is having at least a moderate likelihood of cardiac etiology.    Further care and hospital course per attending physician summary.    Impression 1) chest pain moderate likelihood of cardiac etiology  2.)  Dyspnea on exertion

## 2019-08-21 NOTE — ED NOTES
Med rec updated and complete. Allergies reviewed.  Pt denies antibiotic use in last 14 days.  [facility] pharmacy [facility]

## 2019-08-21 NOTE — ASSESSMENT & PLAN NOTE
-Followed by endocrinology as outpatient.  Recently taken off his insulin  -Holding his home medications for now  -Monitor closely for hyperglycemia now that we have started steroids  -Accu-Cheks AC at bedtime with SSI  -Diabetic diet  -Hypoglycemia protocol

## 2019-08-21 NOTE — ASSESSMENT & PLAN NOTE
-pain is reproducible on palpation and located mostly in his left chest.  -Troponin trended and negative.  -Episode of chest pain today.  Twelve-lead EKG suspicious for right heart strain.  CTA negative for PE.  -Echo unremarkable  -Pulmonary consulted and recommends stress test which has been ordered  -ASA and statin  -Transfer to telemetry

## 2019-08-21 NOTE — CONSULTS
Critical Care/Pulmonary Consultation    Date of Service: 8/21/2019    Date of Admission:  8/20/2019  6:40 PM    Consulting Physician: Delano Bolden M.D.    Chief Complaint:  Epigastric Pain (radiating throughout chest and down LT arm x4 days with SOB. ) and Shortness of Breath (worse with exertion. hx of asthma, getting tested next week for gastroparesis. )      History of Present Illness: Manan Ruffin is a 29 y.o. male with a past medical history of non-insulin dependent Type II DM, hypertension, and asthma who presented 8/20 with complaints of progressive dyspnea on exertion and substernal chest pain with radiation to the right arm. Pain is intermittent and occurs at rest and with activity. He has associated SOB and very little activity is causing him fatigue, weakness and dyspnea. He has been seen multiple times at the local hospital is [place] and was told to take his albuterol for symptoms which has not helped. He and his wife do report apnea during sleep which may be suggestive of ROSE. He also reports environmental allergies.     Interval history:   - Troponin neg, echo shows normal EF with no valvular pathology   - continues to have central/left sided chest pain with radiation to shoulder, given his hx of DM/HTN/HLD would consider getting treadmill stress test   - no evidence of asthma exacerbation, will change his Flovent to Symbicort, went over proper inhaler technique with him at bedside. No need for steroids at this time    Review of Systems   Constitutional: Positive for malaise/fatigue. Negative for chills and fever.   HENT: Negative for sinus pain and sore throat.    Respiratory: Positive for shortness of breath. Negative for cough and wheezing.    Cardiovascular: Positive for chest pain. Negative for palpitations, orthopnea and leg swelling.   Gastrointestinal: Negative for nausea and vomiting.   Neurological: Negative for dizziness and headaches.   Psychiatric/Behavioral: Negative for substance  "abuse. The patient is not nervous/anxious.        Home Medications     Reviewed by Linsey Workman (Pharmacy Tech) on 08/20/19 at 2121  Med List Status: Complete   Medication Last Dose Status   albuterol 108 (90 Base) MCG/ACT Aero Soln inhalation aerosol 8/20/2019 Active   Alogliptin Benzoate 25 MG Tab 8/20/2019 Active   atorvastatin (LIPITOR) 10 MG Tab 8/20/2019 Active   Ertugliflozin L-PyroglutamicAc (STEGLATRO) 15 MG Tab 8/20/2019 Active   fluticasone (FLOVENT HFA) 110 MCG/ACT Aerosol 8/20/2019 Active   lisinopril (PRINIVIL) 2.5 MG Tab 8/20/2019 Active   pioglitazone (ACTOS) 45 MG Tab 8/20/2019 Active                Social History     Tobacco Use   • Smoking status: Never Smoker   • Smokeless tobacco: Never Used   Substance Use Topics   • Alcohol use: Yes     Frequency: 2-4 times a month   • Drug use: Not Currently        Past Medical History:   Diagnosis Date   • Asthma    • Diabetes (HCC)    • High cholesterol    • Hypertension        History reviewed. No pertinent surgical history.    Allergies: Asa [aspirin]; Bloodless; Ibuprofen; and Tomato    History reviewed. No pertinent family history.    Vitals:    08/20/19 1452 08/20/19 1500 08/20/19 1620 08/20/19 1841   Height: 1.753 m (5' 9\")      Weight:  106.6 kg (235 lb 0.2 oz)     Weight % change since last entry.:  0 %     BP: 113/76  109/82 120/71   Pulse: 92  98 80   BMI (Calculated):       Resp: 16  (!) 24 18   Temp: 36.6 °C (97.9 °F)  36.4 °C (97.5 °F)    TempSrc: Temporal  Temporal     08/20/19 1856 08/20/19 2145 08/20/19 2207 08/21/19 0029   Height:   1.753 m (5' 9\")    Weight:   104.5 kg (230 lb 6.1 oz)    Weight % change since last entry.:   -1.97 %    BP: 104/70 123/78 122/72 118/62   Pulse: 86 88 70 86   BMI (Calculated):   34.02    Resp: 18 20 18 20   Temp:   36.6 °C (97.9 °F) 36.2 °C (97.2 °F)   TempSrc:   Temporal Temporal    08/21/19 0150 08/21/19 0418 08/21/19 0629 08/21/19 0808   Height:       Weight:       Weight % change since last entry.:    "    BP:  118/62 122/72 131/80   Pulse: 64 77 63 66   BMI (Calculated):       Resp: 20 17 17   Temp:  36.1 °C (97 °F)  36.2 °C (97.1 °F)   TempSrc:  Temporal  Temporal    08/21/19 1018 08/21/19 1300   Height:     Weight:     Weight % change since last entry.:     BP:  135/79   Pulse: 66 68   BMI (Calculated):     Resp: 20 18   Temp:     TempSrc:         Physical Examination  General: well appearing young male, in mild discomfort   Neuro/Psych: no focal deficits, moving all extremities, normal affect   HEENT: NCAT, PERRL, EOMI, no JVD  CVS: RRR, no murmur, rubs or gallops   Respiratory: lungs clear to ascultation bilaterally, no wheezing or crackles   Abdomen/: soft, non-distended   Extremities: no edema, no clubbing or rash   Skin: no rash     No intake or output data in the 24 hours ending 08/21/19 1406    Recent Labs     08/20/19 2102 08/21/19  0037   WBC 8.4 8.4   NEUTSPOLYS 60.90  --    LYMPHOCYTES 33.50  --    MONOCYTES 4.40  --    EOSINOPHILS 0.60  --    BASOPHILS 0.40  --    ASTSGOT 17  --    ALTSGPT 19  --    ALKPHOSPHAT 42  --    TBILIRUBIN 0.5  --      Recent Labs     08/20/19 2102 08/21/19  0037   SODIUM 137 137   POTASSIUM 3.8 3.7   CHLORIDE 104 105   CO2 23 22   BUN 15 15   CREATININE 0.99 1.03   CALCIUM 9.7 9.5     Recent Labs     08/20/19 2102 08/21/19  0037   ALTSGPT 19  --    ASTSGOT 17  --    ALKPHOSPHAT 42  --    TBILIRUBIN 0.5  --    GLUCOSE 113* 216*         CT-CTA CHEST PULMONARY ARTERY W/ RECONS   Final Result      No evidence of pulmonary embolus            EC-ECHOCARDIOGRAM COMPLETE W/O CONT   Final Result      DX-CHEST-PORTABLE (1 VIEW)   Final Result         1. No acute cardiopulmonary abnormalities are identified.      NM-CARDIAC STRESS TEST    (Results Pending)       Patient Active Problem List   Diagnosis   • Asthma   • Diabetes (HCC)   • High cholesterol   • Hypertension   • Exertional dyspnea   • Chest pain       Assessment and Plan:    28 yo male with PMHx of DM, HTN, HLD and  symptoms suggestive of moderate persistent asthma, however we do not believe that his current symptoms are related to his underlying asthma as this would not cause unilateral chest pain with radiation to his arm. CTPE negative and echo normal however given his medical history and persistent symptoms do recommend cardiac stress testing. He does seem to have under-treated asthma as well     - switch Flovent to Symbicort, proper use taught at bedside   - no need for steroids, he does not appear to be in acute exacerbation   - does have symptoms that suggest possible ROSE, will set up with outpatient sleep study   - will order IgE as well   - recommend treadmill stress test       Jess Pineda MD

## 2019-08-21 NOTE — ASSESSMENT & PLAN NOTE
-Ongoing with associated dizziness  -Symptoms improved with O2  -Negative d-dimer.  Negative CTA for PE  -Echo unremarkable  -Pulmonary consulted  -Stress test pending

## 2019-08-21 NOTE — PROGRESS NOTES
CT contacted for status of imaging. Offered for RN to transfer pt to CT to expedite. CT to update RN.

## 2019-08-22 ENCOUNTER — APPOINTMENT (OUTPATIENT)
Dept: RADIOLOGY | Facility: MEDICAL CENTER | Age: 29
End: 2019-08-22
Attending: INTERNAL MEDICINE
Payer: COMMERCIAL

## 2019-08-22 ENCOUNTER — APPOINTMENT (OUTPATIENT)
Dept: RADIOLOGY | Facility: MEDICAL CENTER | Age: 29
End: 2019-08-22
Attending: NURSE PRACTITIONER
Payer: COMMERCIAL

## 2019-08-22 VITALS
TEMPERATURE: 98 F | BODY MASS INDEX: 34.12 KG/M2 | RESPIRATION RATE: 20 BRPM | HEIGHT: 69 IN | SYSTOLIC BLOOD PRESSURE: 124 MMHG | OXYGEN SATURATION: 96 % | WEIGHT: 230.38 LBS | HEART RATE: 91 BPM | DIASTOLIC BLOOD PRESSURE: 68 MMHG

## 2019-08-22 LAB
ANION GAP SERPL CALC-SCNC: 13 MMOL/L (ref 0–11.9)
BASOPHILS # BLD AUTO: 0.3 % (ref 0–1.8)
BASOPHILS # BLD: 0.03 K/UL (ref 0–0.12)
BUN SERPL-MCNC: 17 MG/DL (ref 8–22)
CALCIUM SERPL-MCNC: 9.8 MG/DL (ref 8.5–10.5)
CHLORIDE SERPL-SCNC: 106 MMOL/L (ref 96–112)
CO2 SERPL-SCNC: 20 MMOL/L (ref 20–33)
CREAT SERPL-MCNC: 0.85 MG/DL (ref 0.5–1.4)
EKG IMPRESSION: NORMAL
EKG IMPRESSION: NORMAL
EOSINOPHIL # BLD AUTO: 0.02 K/UL (ref 0–0.51)
EOSINOPHIL NFR BLD: 0.2 % (ref 0–6.9)
ERYTHROCYTE [DISTWIDTH] IN BLOOD BY AUTOMATED COUNT: 44.2 FL (ref 35.9–50)
GLUCOSE BLD-MCNC: 229 MG/DL (ref 65–99)
GLUCOSE SERPL-MCNC: 171 MG/DL (ref 65–99)
HCT VFR BLD AUTO: 49.5 % (ref 42–52)
HGB BLD-MCNC: 15.9 G/DL (ref 14–18)
IMM GRANULOCYTES # BLD AUTO: 0.03 K/UL (ref 0–0.11)
IMM GRANULOCYTES NFR BLD AUTO: 0.3 % (ref 0–0.9)
LYMPHOCYTES # BLD AUTO: 1.68 K/UL (ref 1–4.8)
LYMPHOCYTES NFR BLD: 17.9 % (ref 22–41)
MAGNESIUM SERPL-MCNC: 2.3 MG/DL (ref 1.5–2.5)
MCH RBC QN AUTO: 31.5 PG (ref 27–33)
MCHC RBC AUTO-ENTMCNC: 32.1 G/DL (ref 33.7–35.3)
MCV RBC AUTO: 98 FL (ref 81.4–97.8)
MONOCYTES # BLD AUTO: 0.43 K/UL (ref 0–0.85)
MONOCYTES NFR BLD AUTO: 4.6 % (ref 0–13.4)
NEUTROPHILS # BLD AUTO: 7.17 K/UL (ref 1.82–7.42)
NEUTROPHILS NFR BLD: 76.7 % (ref 44–72)
NRBC # BLD AUTO: 0 K/UL
NRBC BLD-RTO: 0 /100 WBC
PLATELET # BLD AUTO: 206 K/UL (ref 164–446)
PMV BLD AUTO: 10.1 FL (ref 9–12.9)
POTASSIUM SERPL-SCNC: 3.8 MMOL/L (ref 3.6–5.5)
RBC # BLD AUTO: 5.05 M/UL (ref 4.7–6.1)
SODIUM SERPL-SCNC: 139 MMOL/L (ref 135–145)
TSH SERPL DL<=0.005 MIU/L-ACNC: 0.84 UIU/ML (ref 0.38–5.33)
VIT B12 SERPL-MCNC: 433 PG/ML (ref 211–911)
WBC # BLD AUTO: 9.4 K/UL (ref 4.8–10.8)

## 2019-08-22 PROCEDURE — 83735 ASSAY OF MAGNESIUM: CPT

## 2019-08-22 PROCEDURE — 80048 BASIC METABOLIC PNL TOTAL CA: CPT

## 2019-08-22 PROCEDURE — 84443 ASSAY THYROID STIM HORMONE: CPT

## 2019-08-22 PROCEDURE — 700101 HCHG RX REV CODE 250: Performed by: INTERNAL MEDICINE

## 2019-08-22 PROCEDURE — 82962 GLUCOSE BLOOD TEST: CPT

## 2019-08-22 PROCEDURE — 700111 HCHG RX REV CODE 636 W/ 250 OVERRIDE (IP): Performed by: NURSE PRACTITIONER

## 2019-08-22 PROCEDURE — G0378 HOSPITAL OBSERVATION PER HR: HCPCS

## 2019-08-22 PROCEDURE — 70450 CT HEAD/BRAIN W/O DYE: CPT

## 2019-08-22 PROCEDURE — A9270 NON-COVERED ITEM OR SERVICE: HCPCS | Performed by: INTERNAL MEDICINE

## 2019-08-22 PROCEDURE — 82607 VITAMIN B-12: CPT

## 2019-08-22 PROCEDURE — A9270 NON-COVERED ITEM OR SERVICE: HCPCS | Performed by: NURSE PRACTITIONER

## 2019-08-22 PROCEDURE — 85025 COMPLETE CBC W/AUTO DIFF WBC: CPT

## 2019-08-22 PROCEDURE — A9502 TC99M TETROFOSMIN: HCPCS

## 2019-08-22 PROCEDURE — 700102 HCHG RX REV CODE 250 W/ 637 OVERRIDE(OP): Performed by: INTERNAL MEDICINE

## 2019-08-22 PROCEDURE — 36415 COLL VENOUS BLD VENIPUNCTURE: CPT

## 2019-08-22 PROCEDURE — 700102 HCHG RX REV CODE 250 W/ 637 OVERRIDE(OP): Performed by: NURSE PRACTITIONER

## 2019-08-22 PROCEDURE — 72125 CT NECK SPINE W/O DYE: CPT

## 2019-08-22 PROCEDURE — 82785 ASSAY OF IGE: CPT

## 2019-08-22 PROCEDURE — 700111 HCHG RX REV CODE 636 W/ 250 OVERRIDE (IP)

## 2019-08-22 PROCEDURE — 96372 THER/PROPH/DIAG INJ SC/IM: CPT

## 2019-08-22 PROCEDURE — 99239 HOSP IP/OBS DSCHRG MGMT >30: CPT | Performed by: INTERNAL MEDICINE

## 2019-08-22 PROCEDURE — 94640 AIRWAY INHALATION TREATMENT: CPT

## 2019-08-22 RX ORDER — BUDESONIDE AND FORMOTEROL FUMARATE DIHYDRATE 160; 4.5 UG/1; UG/1
2 AEROSOL RESPIRATORY (INHALATION) 2 TIMES DAILY
Qty: 1 INHALER | Refills: 0 | Status: ON HOLD | OUTPATIENT
Start: 2019-08-22 | End: 2019-11-06 | Stop reason: SDUPTHER

## 2019-08-22 RX ORDER — IPRATROPIUM BROMIDE AND ALBUTEROL SULFATE 2.5; .5 MG/3ML; MG/3ML
3 SOLUTION RESPIRATORY (INHALATION)
Status: DISCONTINUED | OUTPATIENT
Start: 2019-08-22 | End: 2019-08-22 | Stop reason: HOSPADM

## 2019-08-22 RX ORDER — MONTELUKAST SODIUM 10 MG/1
10 TABLET ORAL DAILY
Qty: 30 TAB | Refills: 2 | Status: SHIPPED | OUTPATIENT
Start: 2019-08-23

## 2019-08-22 RX ORDER — MECLIZINE HCL 12.5 MG/1
12.5 TABLET ORAL 3 TIMES DAILY PRN
Qty: 15 TAB | Refills: 0 | Status: SHIPPED | OUTPATIENT
Start: 2019-08-22 | End: 2019-08-27

## 2019-08-22 RX ORDER — REGADENOSON 0.08 MG/ML
INJECTION, SOLUTION INTRAVENOUS
Status: COMPLETED
Start: 2019-08-22 | End: 2019-08-22

## 2019-08-22 RX ORDER — REGADENOSON 0.08 MG/ML
0.4 INJECTION, SOLUTION INTRAVENOUS ONCE
Status: COMPLETED | OUTPATIENT
Start: 2019-08-22 | End: 2019-08-22

## 2019-08-22 RX ADMIN — FAMOTIDINE 20 MG: 20 TABLET ORAL at 06:47

## 2019-08-22 RX ADMIN — AZITHROMYCIN 500 MG: 250 TABLET, FILM COATED ORAL at 06:46

## 2019-08-22 RX ADMIN — MONTELUKAST 10 MG: 10 TABLET, FILM COATED ORAL at 06:46

## 2019-08-22 RX ADMIN — ATORVASTATIN CALCIUM 10 MG: 10 TABLET, FILM COATED ORAL at 06:47

## 2019-08-22 RX ADMIN — PREDNISONE 40 MG: 20 TABLET ORAL at 06:47

## 2019-08-22 RX ADMIN — ALBUTEROL SULFATE 2.5 MG: 2.5 SOLUTION RESPIRATORY (INHALATION) at 10:22

## 2019-08-22 RX ADMIN — CETIRIZINE HYDROCHLORIDE 10 MG: 10 TABLET, FILM COATED ORAL at 06:47

## 2019-08-22 RX ADMIN — INSULIN HUMAN 3 UNITS: 100 INJECTION, SOLUTION PARENTERAL at 14:14

## 2019-08-22 RX ADMIN — REGADENOSON 0.4 MG: 0.08 INJECTION, SOLUTION INTRAVENOUS at 08:27

## 2019-08-22 RX ADMIN — BUDESONIDE AND FORMOTEROL FUMARATE DIHYDRATE 2 PUFF: 160; 4.5 AEROSOL RESPIRATORY (INHALATION) at 10:29

## 2019-08-22 RX ADMIN — LISINOPRIL 2.5 MG: 2.5 TABLET ORAL at 06:47

## 2019-08-22 NOTE — DISCHARGE INSTRUCTIONS
Discharge Instructions    Discharged to home by car with relative. Discharged via wheelchair, hospital escort: Yes.  Special equipment needed: Not Applicable    Be sure to schedule a follow-up appointment with your primary care doctor or any specialists as instructed.     Discharge Plan:   Diet Plan: Discussed  Activity Level: Discussed  Confirmed Follow up Appointment: Patient to Call and Schedule Appointment  Confirmed Symptoms Management: Discussed  Medication Reconciliation Updated: Yes  Influenza Vaccine Indication: Patient Refuses    I understand that a diet low in cholesterol, fat, and sodium is recommended for good health. Unless I have been given specific instructions below for another diet, I accept this instruction as my diet prescription.   Other diet: Diabetic     Special Instructions: None    · Is patient discharged on Warfarin / Coumadin?   No     Depression / Suicide Risk    As you are discharged from this [facility] facility, it is important to learn how to keep safe from harming yourself.    Recognize the warning signs:  · Abrupt changes in personality, positive or negative- including increase in energy   · Giving away possessions  · Change in eating patterns- significant weight changes-  positive or negative  · Change in sleeping patterns- unable to sleep or sleeping all the time   · Unwillingness or inability to communicate  · Depression  · Unusual sadness, discouragement and loneliness  · Talk of wanting to die  · Neglect of personal appearance   · Rebelliousness- reckless behavior  · Withdrawal from people/activities they love  · Confusion- inability to concentrate     If you or a loved one observes any of these behaviors or has concerns about self-harm, here's what you can do:  · Talk about it- your feelings and reasons for harming yourself  · Remove any means that you might use to hurt yourself (examples: pills, rope, extension cords, firearm)  · Get professional help from the community  "(Mental Health, Substance Abuse, psychological counseling)  · Do not be alone:Call your Safe Contact- someone whom you trust who will be there for you.  · Call your local CRISIS HOTLINE 108-6071 or 727-156-6939  · Call your local Children's Mobile Crisis Response Team [place] (234) 709-2350 or www.[employer]  · Call the toll free National Suicide Prevention Hotlines   · National Suicide Prevention Lifeline 187-238-KJWG (5110)  · [employer] Hope Line Network 800-SUICIDE (144-2128)      Discharge Instructions per GULSHAN Aguirre    -FU with PCP - discuss referral to Allergy specialist   -FU with Pulmonary - Dr. Morris    DIET: As Tolerated    ACTIVITY: As Tolerated    DIAGNOSIS: Small right mastoid effusion    Return to ER if your symptoms worsen - chest pain, SOB, dizziness, palpitations, weakness     Effusion  Otitis media with effusion is the presence of fluid in the middle ear. This is a common problem in children, which often follows ear infections. It may be present for weeks or longer after the infection. Unlike an acute ear infection, otitis media with effusion refers only to fluid behind the ear drum and not infection. Children with repeated ear and sinus infections and allergy problems are the most likely to get otitis media with effusion.  CAUSES   The most frequent cause of the fluid buildup is dysfunction of the eustachian tubes. These are the tubes that drain fluid in the ears to the back of the nose (nasopharynx).  SYMPTOMS   · The main symptom of this condition is hearing loss. As a result, you or your child may:  ¨ Listen to the TV at a loud volume.  ¨ Not respond to questions.  ¨ Ask \"what\" often when spoken to.  ¨ Mistake or confuse one sound or word for another.  · There may be a sensation of fullness or pressure but usually not pain.  DIAGNOSIS   · Your health care provider will diagnose this condition by examining you or your child's ears.  · Your health care provider may test " the pressure in you or your child's ear with a tympanometer.  · A hearing test may be conducted if the problem persists.  TREATMENT   · Treatment depends on the duration and the effects of the effusion.  · Antibiotics, decongestants, nose drops, and cortisone-type drugs (tablets or nasal spray) may not be helpful.  · Children with persistent ear effusions may have delayed language or behavioral problems. Children at risk for developmental delays in hearing, learning, and speech may require referral to a specialist earlier than children not at risk.  · You or your child's health care provider may suggest a referral to an ear, nose, and throat surgeon for treatment. The following may help restore normal hearing:  ¨ Drainage of fluid.  ¨ Placement of ear tubes (tympanostomy tubes).  ¨ Removal of adenoids (adenoidectomy).  HOME CARE INSTRUCTIONS   · Avoid secondhand smoke.  · Infants who are  are less likely to have this condition.  · Avoid feeding infants while they are lying flat.  · Avoid known environmental allergens.  · Avoid people who are sick.  SEEK MEDICAL CARE IF:   · Hearing is not better in 3 months.  · Hearing is worse.  · Ear pain.  · Drainage from the ear.  · Dizziness.  MAKE SURE YOU:   · Understand these instructions.  · Will watch your condition.  · Will get help right away if you are not doing well or get worse.  This information is not intended to replace advice given to you by your health care provider. Make sure you discuss any questions you have with your health care provider.  Document Released: 01/25/2006 Document Revised: 01/08/2016 Document Reviewed: 07/15/2014  [employer] Interactive Patient Education © 2017 [employer]

## 2019-08-22 NOTE — DISCHARGE SUMMARY
"Discharge Summary    CHIEF COMPLAINT ON ADMISSION  Chief Complaint   Patient presents with   • Epigastric Pain     radiating throughout chest and down LT arm x4 days with SOB.    • Shortness of Breath     worse with exertion. hx of asthma, getting tested next week for gastroparesis.        Reason for Admission  Chest Pain     Admission Date  8/20/2019    Discharge Date  08/22/19    CODE STATUS  Full Code    HPI & HOSPITAL COURSE  This is a 29-year-old male with PMH diabetes diagnosed 6 years ago, hyperlipidemia and asthma who presented 8/20/2019 with shortness of breath.  This has been going on for \"a while now\", unsure exact duration but he has been seen at hospital in [place] multiple times and told to take his inhaler as needed, however he has not gotten any improvement.  Please see full H&P by Dr. Lu.      Troponin trended and negative.  Initial twelve-lead EKG showed no acute changes. He had episode of chest pain morning after presentation which showed concern for right heart strain.  D-dimer was negative.  CTA was negative for PE.     He was treated for asthma exacerbation/reactive airway disease with steroids, Singulair, Zyrtec, inhalers and nebulizer's. Pulmonary was consulted and recommended stress test which was negative for ischemia. He was initially treated for asthma exacerbation/reactive airway disease with PO steroids, Singulair, nebulizers and inhalers.  He will not be discharged with steroids or nebulizers as pulmonary suggest this is not an asthma exacerbation.    This morning he complained of increased weakness in his LUE which is his dominant hand.  Strength was on assessment, however he was noted to use his left arm without any issues by nursing staff during meals and ambulating with his O2.  CT head and C-spine were unremarkable.  Ambulation studies showed patient does not qualify for home O2.  Room air sats have been greater than 96% throughout his hospitalization.     He was seen and " examined prior to discharge.  He is sitting up in bed on room air with respiratory rate 18, O2 sat 96%, and clear lungs without any signs of distress.  He was seen by pulmonary prior to discharge as well and will follow up with them as outpatient.  He is agreeable to the plan of care and ready for discharge home today.    Therefore, he is discharged in good and stable condition to home with close outpatient follow-up.    FOLLOW UP ITEMS POST DISCHARGE  Discharge Instructions per GULSHAN Aguirre    -FU with PCP  -FU with Pulmonary - Dr. Morris    DIET: As Tolerated    ACTIVITY: As Tolerated    DIAGNOSIS: Small right mastoid effusion    Return to ER if your symptoms worsen - chest pain, SOB, dizziness, palpitations, weakness.    DISCHARGE DIAGNOSES  Active Problems:    Asthma POA: Yes    Diabetes (HCC) (Chronic) POA: Yes    Dyslipidemia POA: Yes    Hypertension POA: Yes    Exertional dyspnea POA: Yes    Chest pain POA: Yes  Resolved Problems:    * No resolved hospital problems. *      FOLLOW UP  Future Appointments   Date Time Provider Department Center   8/22/2019  2:00 PM [facility] CT 2 TRAUMA [facility]     Primary Care Provider       Please make an appointment with your primary care provider once back home in [facility].Thank you       MEDICATIONS ON DISCHARGE     Medication List      START taking these medications      Instructions   budesonide-formoterol 160-4.5 MCG/ACT Aero  Commonly known as:  SYMBICORT   Inhale 2 Puffs by mouth 2 Times a Day.  Dose:  2 Puff     meclizine 12.5 MG Tabs  Commonly known as:  ANTIVERT   Take 1 Tab by mouth 3 times a day as needed for up to 5 days.  Dose:  12.5 mg     montelukast 10 MG Tabs  Start taking on:  8/23/2019  Commonly known as:  SINGULAIR   Take 1 Tab by mouth every day.  Dose:  10 mg        CONTINUE taking these medications      Instructions   Alogliptin Benzoate 25 MG Tabs   Take 25 mg by mouth every morning.  Dose:  25 mg     atorvastatin 10 MG  Tabs  Commonly known as:  LIPITOR   Take 10 mg by mouth every day.  Dose:  10 mg     fluticasone 110 MCG/ACT Aero  Commonly known as:  FLOVENT HFA   Inhale 2 Puffs by mouth 2 times a day.  Dose:  2 Puff     lisinopril 2.5 MG Tabs  Commonly known as:  PRINIVIL   Take 2.5 mg by mouth every day.  Dose:  2.5 mg     pioglitazone 45 MG Tabs  Commonly known as:  ACTOS   Take 45 mg by mouth every day.  Dose:  45 mg     STEGLATRO 15 MG Tabs  Generic drug:  Ertugliflozin L-PyroglutamicAc   Take 15 mg by mouth every day.  Dose:  15 mg        STOP taking these medications    albuterol 108 (90 Base) MCG/ACT Aers inhalation aerosol            Allergies  Allergies   Allergen Reactions   • Asa [Aspirin]      Swollen throat    • Bloodless    • Ibuprofen      Swollen throat   • Tomato        DIET  Orders Placed This Encounter   Procedures   • Diet Order Diabetic (no pork )     Standing Status:   Standing     Number of Occurrences:   1     Order Specific Question:   Diet:     Answer:   Diabetic [3]     Comments:   no pork      Order Specific Question:   Miscellaneous modifications:     Answer:   No Decaf, No Caffeine(for test) [11]       ACTIVITY  As tolerated.  Weight bearing as tolerated    CONSULTATIONS  Pulmonary    PROCEDURES  NONE    LABORATORY  Lab Results   Component Value Date    SODIUM 139 08/22/2019    POTASSIUM 3.8 08/22/2019    CHLORIDE 106 08/22/2019    CO2 20 08/22/2019    GLUCOSE 171 (H) 08/22/2019    BUN 17 08/22/2019    CREATININE 0.85 08/22/2019        Lab Results   Component Value Date    WBC 9.4 08/22/2019    HEMOGLOBIN 15.9 08/22/2019    HEMATOCRIT 49.5 08/22/2019    PLATELETCT 206 08/22/2019      GULSHAN Aguirre

## 2019-08-22 NOTE — PROGRESS NOTES
Patient slept most of the night, woke up with heaviness to left arm but no pain.  Patient stated he felt like that for a bit yesterday.  Will continue to monitor.  NPO for stress test in AM.

## 2019-08-22 NOTE — PROGRESS NOTES
Pt dc'd home. IV and monitor removed; Pt left unit via wheelchair with RN; Transported home by wife. Personal belongings with pt when leaving unit. Pt given discharge instructions prior to leaving unit including prescription and when to visit with physician; verbalizes understanding. Copy of discharge instructions with pt and in the chart.

## 2019-08-22 NOTE — CARE PLAN
Problem: Communication  Goal: The ability to communicate needs accurately and effectively will improve  Outcome: PROGRESSING SLOWER THAN EXPECTED     Problem: Safety  Goal: Will remain free from injury  Outcome: PROGRESSING SLOWER THAN EXPECTED  Goal: Will remain free from falls  Outcome: PROGRESSING SLOWER THAN EXPECTED     Problem: Pain Management  Goal: Pain level will decrease to patient's comfort goal  Outcome: PROGRESSING SLOWER THAN EXPECTED

## 2019-08-24 LAB — IGE SERPL-ACNC: 55 KU/L

## 2019-11-05 ENCOUNTER — HOSPITAL ENCOUNTER (OUTPATIENT)
Facility: MEDICAL CENTER | Age: 29
End: 2019-11-06
Attending: EMERGENCY MEDICINE | Admitting: INTERNAL MEDICINE
Payer: COMMERCIAL

## 2019-11-05 ENCOUNTER — APPOINTMENT (OUTPATIENT)
Dept: RADIOLOGY | Facility: MEDICAL CENTER | Age: 29
End: 2019-11-05
Attending: EMERGENCY MEDICINE
Payer: COMMERCIAL

## 2019-11-05 DIAGNOSIS — J45.41 MODERATE PERSISTENT ASTHMA WITH ACUTE EXACERBATION: Primary | ICD-10-CM

## 2019-11-05 DIAGNOSIS — R07.9 CHEST PAIN, UNSPECIFIED TYPE: ICD-10-CM

## 2019-11-05 DIAGNOSIS — F41.8 SITUATIONAL ANXIETY: ICD-10-CM

## 2019-11-05 PROBLEM — R07.81 PLEURITIC CHEST PAIN: Status: ACTIVE | Noted: 2019-11-05

## 2019-11-05 LAB
ALBUMIN SERPL BCP-MCNC: 4.8 G/DL (ref 3.2–4.9)
ALBUMIN/GLOB SERPL: 1.5 G/DL
ALP SERPL-CCNC: 74 U/L (ref 30–99)
ALT SERPL-CCNC: 18 U/L (ref 2–50)
AMPHET UR QL SCN: NEGATIVE
ANION GAP SERPL CALC-SCNC: 11 MMOL/L (ref 0–11.9)
AST SERPL-CCNC: 16 U/L (ref 12–45)
BARBITURATES UR QL SCN: NEGATIVE
BASOPHILS # BLD AUTO: 0.5 % (ref 0–1.8)
BASOPHILS # BLD: 0.06 K/UL (ref 0–0.12)
BENZODIAZ UR QL SCN: NEGATIVE
BILIRUB SERPL-MCNC: 0.4 MG/DL (ref 0.1–1.5)
BUN SERPL-MCNC: 15 MG/DL (ref 8–22)
BZE UR QL SCN: NEGATIVE
CALCIUM SERPL-MCNC: 9.9 MG/DL (ref 8.5–10.5)
CANNABINOIDS UR QL SCN: NEGATIVE
CHLORIDE SERPL-SCNC: 101 MMOL/L (ref 96–112)
CO2 SERPL-SCNC: 22 MMOL/L (ref 20–33)
CREAT SERPL-MCNC: 1.05 MG/DL (ref 0.5–1.4)
CRP SERPL HS-MCNC: 0.33 MG/DL (ref 0–0.75)
D DIMER PPP IA.FEU-MCNC: <0.4 UG/ML (FEU) (ref 0–0.5)
EKG IMPRESSION: NORMAL
EKG IMPRESSION: NORMAL
EOSINOPHIL # BLD AUTO: 0.06 K/UL (ref 0–0.51)
EOSINOPHIL NFR BLD: 0.5 % (ref 0–6.9)
ERYTHROCYTE [DISTWIDTH] IN BLOOD BY AUTOMATED COUNT: 40.6 FL (ref 35.9–50)
ERYTHROCYTE [SEDIMENTATION RATE] IN BLOOD BY WESTERGREN METHOD: 31 MM/HOUR (ref 0–15)
GLOBULIN SER CALC-MCNC: 3.1 G/DL (ref 1.9–3.5)
GLUCOSE BLD-MCNC: 361 MG/DL (ref 65–99)
GLUCOSE SERPL-MCNC: 363 MG/DL (ref 65–99)
HCT VFR BLD AUTO: 51.9 % (ref 42–52)
HGB BLD-MCNC: 17.3 G/DL (ref 14–18)
IMM GRANULOCYTES # BLD AUTO: 0.1 K/UL (ref 0–0.11)
IMM GRANULOCYTES NFR BLD AUTO: 0.9 % (ref 0–0.9)
LYMPHOCYTES # BLD AUTO: 2.35 K/UL (ref 1–4.8)
LYMPHOCYTES NFR BLD: 20.2 % (ref 22–41)
MAGNESIUM SERPL-MCNC: 2 MG/DL (ref 1.5–2.5)
MCH RBC QN AUTO: 30.7 PG (ref 27–33)
MCHC RBC AUTO-ENTMCNC: 33.3 G/DL (ref 33.7–35.3)
MCV RBC AUTO: 92.2 FL (ref 81.4–97.8)
METHADONE UR QL SCN: NEGATIVE
MONOCYTES # BLD AUTO: 0.26 K/UL (ref 0–0.85)
MONOCYTES NFR BLD AUTO: 2.2 % (ref 0–13.4)
NEUTROPHILS # BLD AUTO: 8.82 K/UL (ref 1.82–7.42)
NEUTROPHILS NFR BLD: 75.7 % (ref 44–72)
NRBC # BLD AUTO: 0 K/UL
NRBC BLD-RTO: 0 /100 WBC
NT-PROBNP SERPL IA-MCNC: <5 PG/ML (ref 0–125)
OPIATES UR QL SCN: NEGATIVE
OXYCODONE UR QL SCN: NEGATIVE
PCP UR QL SCN: NEGATIVE
PLATELET # BLD AUTO: 252 K/UL (ref 164–446)
PMV BLD AUTO: 10.7 FL (ref 9–12.9)
POTASSIUM SERPL-SCNC: 4.4 MMOL/L (ref 3.6–5.5)
PROCALCITONIN SERPL-MCNC: <0.05 NG/ML
PROPOXYPH UR QL SCN: NEGATIVE
PROT SERPL-MCNC: 7.9 G/DL (ref 6–8.2)
RBC # BLD AUTO: 5.63 M/UL (ref 4.7–6.1)
SODIUM SERPL-SCNC: 134 MMOL/L (ref 135–145)
T4 FREE SERPL-MCNC: 0.94 NG/DL (ref 0.53–1.43)
TROPONIN T SERPL-MCNC: <6 NG/L (ref 6–19)
TROPONIN T SERPL-MCNC: <6 NG/L (ref 6–19)
TSH SERPL DL<=0.005 MIU/L-ACNC: 0.96 UIU/ML (ref 0.38–5.33)
WBC # BLD AUTO: 11.7 K/UL (ref 4.8–10.8)

## 2019-11-05 PROCEDURE — 83735 ASSAY OF MAGNESIUM: CPT

## 2019-11-05 PROCEDURE — 93005 ELECTROCARDIOGRAM TRACING: CPT | Performed by: EMERGENCY MEDICINE

## 2019-11-05 PROCEDURE — 84443 ASSAY THYROID STIM HORMONE: CPT

## 2019-11-05 PROCEDURE — 84145 PROCALCITONIN (PCT): CPT

## 2019-11-05 PROCEDURE — 84439 ASSAY OF FREE THYROXINE: CPT

## 2019-11-05 PROCEDURE — 306588 SLEEVE,VASO CALF MED: Performed by: INTERNAL MEDICINE

## 2019-11-05 PROCEDURE — 84484 ASSAY OF TROPONIN QUANT: CPT | Mod: 91

## 2019-11-05 PROCEDURE — G0378 HOSPITAL OBSERVATION PER HR: HCPCS

## 2019-11-05 PROCEDURE — 99223 1ST HOSP IP/OBS HIGH 75: CPT | Performed by: INTERNAL MEDICINE

## 2019-11-05 PROCEDURE — 96374 THER/PROPH/DIAG INJ IV PUSH: CPT

## 2019-11-05 PROCEDURE — 85379 FIBRIN DEGRADATION QUANT: CPT

## 2019-11-05 PROCEDURE — 96375 TX/PRO/DX INJ NEW DRUG ADDON: CPT

## 2019-11-05 PROCEDURE — 96376 TX/PRO/DX INJ SAME DRUG ADON: CPT

## 2019-11-05 PROCEDURE — 700102 HCHG RX REV CODE 250 W/ 637 OVERRIDE(OP): Performed by: INTERNAL MEDICINE

## 2019-11-05 PROCEDURE — 80053 COMPREHEN METABOLIC PANEL: CPT

## 2019-11-05 PROCEDURE — 82962 GLUCOSE BLOOD TEST: CPT

## 2019-11-05 PROCEDURE — 85025 COMPLETE CBC W/AUTO DIFF WBC: CPT

## 2019-11-05 PROCEDURE — 94640 AIRWAY INHALATION TREATMENT: CPT

## 2019-11-05 PROCEDURE — 93005 ELECTROCARDIOGRAM TRACING: CPT

## 2019-11-05 PROCEDURE — 99285 EMERGENCY DEPT VISIT HI MDM: CPT

## 2019-11-05 PROCEDURE — 700111 HCHG RX REV CODE 636 W/ 250 OVERRIDE (IP): Performed by: EMERGENCY MEDICINE

## 2019-11-05 PROCEDURE — 85652 RBC SED RATE AUTOMATED: CPT

## 2019-11-05 PROCEDURE — 87502 INFLUENZA DNA AMP PROBE: CPT

## 2019-11-05 PROCEDURE — 700111 HCHG RX REV CODE 636 W/ 250 OVERRIDE (IP): Performed by: INTERNAL MEDICINE

## 2019-11-05 PROCEDURE — 86140 C-REACTIVE PROTEIN: CPT

## 2019-11-05 PROCEDURE — 700101 HCHG RX REV CODE 250: Performed by: EMERGENCY MEDICINE

## 2019-11-05 PROCEDURE — 700105 HCHG RX REV CODE 258: Performed by: INTERNAL MEDICINE

## 2019-11-05 PROCEDURE — 96372 THER/PROPH/DIAG INJ SC/IM: CPT

## 2019-11-05 PROCEDURE — 94760 N-INVAS EAR/PLS OXIMETRY 1: CPT

## 2019-11-05 PROCEDURE — A9270 NON-COVERED ITEM OR SERVICE: HCPCS | Performed by: INTERNAL MEDICINE

## 2019-11-05 PROCEDURE — 83880 ASSAY OF NATRIURETIC PEPTIDE: CPT

## 2019-11-05 PROCEDURE — 80307 DRUG TEST PRSMV CHEM ANLYZR: CPT

## 2019-11-05 PROCEDURE — 71046 X-RAY EXAM CHEST 2 VIEWS: CPT

## 2019-11-05 PROCEDURE — 700101 HCHG RX REV CODE 250: Performed by: INTERNAL MEDICINE

## 2019-11-05 RX ORDER — MONTELUKAST SODIUM 10 MG/1
10 TABLET ORAL DAILY
Status: DISCONTINUED | OUTPATIENT
Start: 2019-11-06 | End: 2019-11-06 | Stop reason: HOSPADM

## 2019-11-05 RX ORDER — PROMETHAZINE HYDROCHLORIDE 25 MG/1
12.5-25 SUPPOSITORY RECTAL EVERY 4 HOURS PRN
Status: DISCONTINUED | OUTPATIENT
Start: 2019-11-05 | End: 2019-11-06 | Stop reason: HOSPADM

## 2019-11-05 RX ORDER — LABETALOL HYDROCHLORIDE 5 MG/ML
10 INJECTION, SOLUTION INTRAVENOUS EVERY 4 HOURS PRN
Status: DISCONTINUED | OUTPATIENT
Start: 2019-11-05 | End: 2019-11-06 | Stop reason: HOSPADM

## 2019-11-05 RX ORDER — ENALAPRILAT 1.25 MG/ML
1.25 INJECTION INTRAVENOUS EVERY 6 HOURS PRN
Status: DISCONTINUED | OUTPATIENT
Start: 2019-11-05 | End: 2019-11-06 | Stop reason: HOSPADM

## 2019-11-05 RX ORDER — LEVALBUTEROL INHALATION SOLUTION 0.63 MG/3ML
0.63 SOLUTION RESPIRATORY (INHALATION)
Status: DISCONTINUED | OUTPATIENT
Start: 2019-11-05 | End: 2019-11-06 | Stop reason: HOSPADM

## 2019-11-05 RX ORDER — ACETAMINOPHEN 325 MG/1
650 TABLET ORAL EVERY 6 HOURS PRN
Status: DISCONTINUED | OUTPATIENT
Start: 2019-11-05 | End: 2019-11-06 | Stop reason: HOSPADM

## 2019-11-05 RX ORDER — BISACODYL 10 MG
10 SUPPOSITORY, RECTAL RECTAL
Status: DISCONTINUED | OUTPATIENT
Start: 2019-11-05 | End: 2019-11-06 | Stop reason: HOSPADM

## 2019-11-05 RX ORDER — PROCHLORPERAZINE EDISYLATE 5 MG/ML
5-10 INJECTION INTRAMUSCULAR; INTRAVENOUS EVERY 4 HOURS PRN
Status: DISCONTINUED | OUTPATIENT
Start: 2019-11-05 | End: 2019-11-06 | Stop reason: HOSPADM

## 2019-11-05 RX ORDER — ONDANSETRON 4 MG/1
4 TABLET, ORALLY DISINTEGRATING ORAL EVERY 4 HOURS PRN
Status: DISCONTINUED | OUTPATIENT
Start: 2019-11-05 | End: 2019-11-06 | Stop reason: HOSPADM

## 2019-11-05 RX ORDER — LORAZEPAM 2 MG/ML
1 INJECTION INTRAMUSCULAR ONCE
Status: COMPLETED | OUTPATIENT
Start: 2019-11-05 | End: 2019-11-05

## 2019-11-05 RX ORDER — ALBUTEROL SULFATE 90 UG/1
2 AEROSOL, METERED RESPIRATORY (INHALATION) EVERY 6 HOURS PRN
Status: ON HOLD | COMMUNITY
End: 2019-11-06 | Stop reason: SDUPTHER

## 2019-11-05 RX ORDER — PIOGLITAZONEHYDROCHLORIDE 45 MG/1
45 TABLET ORAL EVERY MORNING
Status: DISCONTINUED | OUTPATIENT
Start: 2019-11-06 | End: 2019-11-06 | Stop reason: HOSPADM

## 2019-11-05 RX ORDER — ONDANSETRON 2 MG/ML
4 INJECTION INTRAMUSCULAR; INTRAVENOUS EVERY 4 HOURS PRN
Status: DISCONTINUED | OUTPATIENT
Start: 2019-11-05 | End: 2019-11-06 | Stop reason: HOSPADM

## 2019-11-05 RX ORDER — LEVALBUTEROL INHALATION SOLUTION 0.63 MG/3ML
0.63 SOLUTION RESPIRATORY (INHALATION)
Status: DISCONTINUED | OUTPATIENT
Start: 2019-11-05 | End: 2019-11-05

## 2019-11-05 RX ORDER — LORAZEPAM 2 MG/ML
0.5 INJECTION INTRAMUSCULAR ONCE
Status: COMPLETED | OUTPATIENT
Start: 2019-11-05 | End: 2019-11-05

## 2019-11-05 RX ORDER — AMOXICILLIN 250 MG
2 CAPSULE ORAL 2 TIMES DAILY
Status: DISCONTINUED | OUTPATIENT
Start: 2019-11-05 | End: 2019-11-06 | Stop reason: HOSPADM

## 2019-11-05 RX ORDER — METHYLPREDNISOLONE SODIUM SUCCINATE 40 MG/ML
40 INJECTION, POWDER, LYOPHILIZED, FOR SOLUTION INTRAMUSCULAR; INTRAVENOUS EVERY 12 HOURS
Status: DISCONTINUED | OUTPATIENT
Start: 2019-11-05 | End: 2019-11-06 | Stop reason: HOSPADM

## 2019-11-05 RX ORDER — HYDROXYZINE 50 MG/1
50 TABLET, FILM COATED ORAL 3 TIMES DAILY PRN
Qty: 30 TAB | Refills: 0 | Status: SHIPPED | OUTPATIENT
Start: 2019-11-05 | End: 2019-11-06 | Stop reason: SDUPTHER

## 2019-11-05 RX ORDER — POLYETHYLENE GLYCOL 3350 17 G/17G
1 POWDER, FOR SOLUTION ORAL
Status: DISCONTINUED | OUTPATIENT
Start: 2019-11-05 | End: 2019-11-06 | Stop reason: HOSPADM

## 2019-11-05 RX ORDER — ATORVASTATIN CALCIUM 10 MG/1
10 TABLET, FILM COATED ORAL DAILY
Status: DISCONTINUED | OUTPATIENT
Start: 2019-11-06 | End: 2019-11-06 | Stop reason: HOSPADM

## 2019-11-05 RX ORDER — LISINOPRIL 2.5 MG/1
2.5 TABLET ORAL EVERY MORNING
Status: DISCONTINUED | OUTPATIENT
Start: 2019-11-06 | End: 2019-11-06 | Stop reason: HOSPADM

## 2019-11-05 RX ORDER — AMOXICILLIN 250 MG/1
250 CAPSULE ORAL 2 TIMES DAILY
COMMUNITY
Start: 2019-10-30

## 2019-11-05 RX ORDER — IPRATROPIUM BROMIDE AND ALBUTEROL SULFATE 2.5; .5 MG/3ML; MG/3ML
3 SOLUTION RESPIRATORY (INHALATION)
Status: COMPLETED | OUTPATIENT
Start: 2019-11-05 | End: 2019-11-05

## 2019-11-05 RX ORDER — SODIUM CHLORIDE, SODIUM LACTATE, POTASSIUM CHLORIDE, CALCIUM CHLORIDE 600; 310; 30; 20 MG/100ML; MG/100ML; MG/100ML; MG/100ML
INJECTION, SOLUTION INTRAVENOUS CONTINUOUS
Status: DISCONTINUED | OUTPATIENT
Start: 2019-11-05 | End: 2019-11-06 | Stop reason: HOSPADM

## 2019-11-05 RX ORDER — BUDESONIDE AND FORMOTEROL FUMARATE DIHYDRATE 160; 4.5 UG/1; UG/1
2 AEROSOL RESPIRATORY (INHALATION) 2 TIMES DAILY
Status: DISCONTINUED | OUTPATIENT
Start: 2019-11-05 | End: 2019-11-06 | Stop reason: HOSPADM

## 2019-11-05 RX ORDER — PREDNISONE 10 MG/1
10 TABLET ORAL DAILY
Status: ON HOLD | COMMUNITY
End: 2019-11-06

## 2019-11-05 RX ORDER — ALOGLIPTIN 25 MG/1
25 TABLET, FILM COATED ORAL EVERY MORNING
Status: DISCONTINUED | OUTPATIENT
Start: 2019-11-05 | End: 2019-11-06 | Stop reason: HOSPADM

## 2019-11-05 RX ORDER — CLONIDINE HYDROCHLORIDE 0.1 MG/1
0.1 TABLET ORAL EVERY 6 HOURS PRN
Status: DISCONTINUED | OUTPATIENT
Start: 2019-11-05 | End: 2019-11-06 | Stop reason: HOSPADM

## 2019-11-05 RX ORDER — PROMETHAZINE HYDROCHLORIDE 25 MG/1
12.5-25 TABLET ORAL EVERY 4 HOURS PRN
Status: DISCONTINUED | OUTPATIENT
Start: 2019-11-05 | End: 2019-11-06 | Stop reason: HOSPADM

## 2019-11-05 RX ADMIN — BUDESONIDE AND FORMOTEROL FUMARATE DIHYDRATE 2 PUFF: 160; 4.5 AEROSOL RESPIRATORY (INHALATION) at 19:48

## 2019-11-05 RX ADMIN — INSULIN HUMAN 5 UNITS: 100 INJECTION, SOLUTION PARENTERAL at 23:17

## 2019-11-05 RX ADMIN — METHYLPREDNISOLONE SODIUM SUCCINATE 40 MG: 40 INJECTION, POWDER, FOR SOLUTION INTRAMUSCULAR; INTRAVENOUS at 18:59

## 2019-11-05 RX ADMIN — LORAZEPAM 0.5 MG: 2 INJECTION INTRAMUSCULAR; INTRAVENOUS at 12:55

## 2019-11-05 RX ADMIN — SODIUM CHLORIDE, POTASSIUM CHLORIDE, SODIUM LACTATE AND CALCIUM CHLORIDE: 600; 310; 30; 20 INJECTION, SOLUTION INTRAVENOUS at 18:53

## 2019-11-05 RX ADMIN — LEVALBUTEROL HYDROCHLORIDE 0.63 MG: 0.63 SOLUTION RESPIRATORY (INHALATION) at 22:02

## 2019-11-05 RX ADMIN — LORAZEPAM 1 MG: 2 INJECTION INTRAMUSCULAR; INTRAVENOUS at 13:53

## 2019-11-05 RX ADMIN — IPRATROPIUM BROMIDE AND ALBUTEROL SULFATE 3 ML: .5; 3 SOLUTION RESPIRATORY (INHALATION) at 12:43

## 2019-11-05 RX ADMIN — LORAZEPAM 1 MG: 2 INJECTION INTRAMUSCULAR; INTRAVENOUS at 16:13

## 2019-11-05 ASSESSMENT — LIFESTYLE VARIABLES
TOTAL SCORE: 0
AVERAGE NUMBER OF DAYS PER WEEK YOU HAVE A DRINK CONTAINING ALCOHOL: 0
HAVE YOU EVER FELT YOU SHOULD CUT DOWN ON YOUR DRINKING: NO
TOTAL SCORE: 0
HAVE PEOPLE ANNOYED YOU BY CRITICIZING YOUR DRINKING: NO
DOES PATIENT WANT TO STOP DRINKING: NO
AVERAGE NUMBER OF DAYS PER WEEK YOU HAVE A DRINK CONTAINING ALCOHOL: 3
EVER HAD A DRINK FIRST THING IN THE MORNING TO STEADY YOUR NERVES TO GET RID OF A HANGOVER: NO
HOW MANY TIMES IN THE PAST YEAR HAVE YOU HAD 5 OR MORE DRINKS IN A DAY: 0
EVER HAD A DRINK FIRST THING IN THE MORNING TO STEADY YOUR NERVES TO GET RID OF A HANGOVER: NO
EVER FELT BAD OR GUILTY ABOUT YOUR DRINKING: NO
ON A TYPICAL DAY WHEN YOU DRINK ALCOHOL HOW MANY DRINKS DO YOU HAVE: 2
TOTAL SCORE: 0
ALCOHOL_USE: NO
AVERAGE NUMBER OF DAYS PER WEEK YOU HAVE A DRINK CONTAINING ALCOHOL: 3
TOTAL SCORE: 0
TOTAL SCORE: 0
ON A TYPICAL DAY WHEN YOU DRINK ALCOHOL HOW MANY DRINKS DO YOU HAVE: 2
CONSUMPTION TOTAL: NEGATIVE
CONSUMPTION TOTAL: NEGATIVE
ALCOHOL_USE: YES
DOES PATIENT WANT TO STOP DRINKING: NO
TOTAL SCORE: 0
ON A TYPICAL DAY WHEN YOU DRINK ALCOHOL HOW MANY DRINKS DO YOU HAVE: 0
EVER_SMOKED: NEVER
CONSUMPTION TOTAL: INCOMPLETE
HAVE PEOPLE ANNOYED YOU BY CRITICIZING YOUR DRINKING: NO
ALCOHOL_USE: YES
EVER FELT BAD OR GUILTY ABOUT YOUR DRINKING: NO
HOW MANY TIMES IN THE PAST YEAR HAVE YOU HAD 5 OR MORE DRINKS IN A DAY: 0
DOES PATIENT WANT TO STOP DRINKING: NO
HOW MANY TIMES IN THE PAST YEAR HAVE YOU HAD 5 OR MORE DRINKS IN A DAY: 0
HAVE YOU EVER FELT YOU SHOULD CUT DOWN ON YOUR DRINKING: NO

## 2019-11-05 ASSESSMENT — COPD QUESTIONNAIRES
DURING THE PAST 4 WEEKS HOW MUCH DID YOU FEEL SHORT OF BREATH: MOST  OR ALL OF THE TIME
HAVE YOU SMOKED AT LEAST 100 CIGARETTES IN YOUR ENTIRE LIFE: NO/DON'T KNOW
COPD SCREENING SCORE: 4
DO YOU EVER COUGH UP ANY MUCUS OR PHLEGM?: YES, EVERY DAY

## 2019-11-05 NOTE — ED PROVIDER NOTES
Face to Face Note  -  Durable Medical Equipment    Omer Echeverria M.D. - NPI: [id number]  I certify that this patient is under my care and that they had a durable medical equipment(DME)face to face encounter by myself that meets the physician DME face-to-face encounter requirements with this patient on:    Date of encounter:   Patient:                    MRN:                       YOB: 2020  Manan Ruffin  [id number]  1990     The encounter with the patient was in whole, or in part, for the following medical condition, which is the primary reason for durable medical equipment:  Asthma    I certify that, based on my findings, the following durable medical equipment is medically necessary:  Nebulizer.    HOME O2 Saturation Measurements:(Values must be present for Home Oxygen orders)         ,     ,         My Clinical findings support the need for the above equipment due to:  Wheezing (Chronic)    Supporting Symptoms: wheezing

## 2019-11-05 NOTE — ED PROVIDER NOTES
ED Provider Note    Scribed for Omer Echeverria M.D. by Shlomo Guerrero. 11/5/2019  12:26 PM    Primary care provider: No primary care provider noted.  Means of arrival: Walk-In  History obtained from: Patient  History limited by: None    CHIEF COMPLAINT  Chief Complaint   Patient presents with   • Shortness of Breath   • Chest Pain       HPI  Manan Ruffin is a 29 y.o. male who presents to the Emergency Department for evaluation of shortness of breath, onset two weeks ago. He states that his symptoms have been continually worsening. He notes associated chest pain, tactile fever, and cough. He notes that he has been using his asthma inhaler and has found little alleviation of his symptoms. Additional to this, the patient notes that he has been waking up at night gasping for air. The patient's girlfriend notes that he frequently snores. He is negative for fever. He notes that he recently lost his voice and was diagnosed with a sinus infection. He just finished his prednisone prescription. The patient notes regularly taking allergy medication.       REVIEW OF SYSTEMS  Pertinent positives include shortness of breath, chest pain, tactile fever, cough, night time snoring. Pertinent negatives include no fever.  All other systems reviewed and negative.    PAST MEDICAL HISTORY   has a past medical history of Asthma, Diabetes (HCC), High cholesterol, and Hypertension.    SURGICAL HISTORY  patient denies any surgical history    SOCIAL HISTORY  Social History     Tobacco Use   • Smoking status: Never Smoker   • Smokeless tobacco: Never Used   Substance Use Topics   • Alcohol use: Yes     Frequency: 2-4 times a month   • Drug use: Not Currently      Social History     Substance and Sexual Activity   Drug Use Not Currently       FAMILY HISTORY  No family history on file.    CURRENT MEDICATIONS  Home Medications     Reviewed by Linsey Hernandez (Pharmacy Tech) on 11/05/19 at 1450  Med List Status: Complete   Medication Last  "Dose Status   albuterol 108 (90 Base) MCG/ACT Aero Soln inhalation aerosol 11/5/2019 Active   Alogliptin Benzoate 25 MG Tab 11/4/2019 Active   amoxicillin (AMOXIL) 250 MG Cap 11/4/2019 Active   atorvastatin (LIPITOR) 10 MG Tab 11/4/2019 Active   budesonide-formoterol (SYMBICORT) 160-4.5 MCG/ACT Aerosol 11/4/2019 Active   Ertugliflozin L-PyroglutamicAc (STEGLATRO) 15 MG Tab 11/4/2019 Active   lisinopril (PRINIVIL) 2.5 MG Tab 11/4/2019 Active   montelukast (SINGULAIR) 10 MG Tab 11/4/2019 Active   pioglitazone (ACTOS) 45 MG Tab 11/4/2019 Active   predniSONE (DELTASONE) 10 MG Tab 11/5/2019 Active                ALLERGIES  Allergies   Allergen Reactions   • Asa [Aspirin]      Swollen throat    • Bloodless    • Ibuprofen      Swollen throat   • Tomato        PHYSICAL EXAM  VITAL SIGNS: /94   Pulse 92   Temp 36.6 °C (97.9 °F) (Temporal)   Resp 16   Ht 1.753 m (5' 9\")   SpO2 97%   BMI 34.02 kg/m²      Constitutional: Well developed, Well nourished, moderate distress, Non-toxic appearance.   HENT: Normocephalic, Atraumatic, Bilateral external ears normal, Oropharynx moist, No oral exudates.   Eyes: PERRLA, EOMI, Conjunctiva normal, No discharge.   Neck: No tenderness, Supple, No stridor.   Lymphatic: No lymphadenopathy noted.   Cardiovascular: Normal heart rate, Normal rhythm.   Thorax & Lungs: Tachypnea with scant expiratory wheezing, Clear to auscultation bilaterally, No respiratory distress, No wheezing, No crackles.   Abdomen: Soft, No tenderness, No masses, No pulsatile masses.   Skin: Warm, Dry, No erythema, No rash.   Extremities:, No edema No cyanosis.   Musculoskeletal: No tenderness to palpation or major deformities noted.  Intact distal pulses  Neurologic: Awake, alert. Moves all extremities spontaneously.  Psychiatric: Slightly anxious, Judgment normal, Mood normal.     LABS  Results for orders placed or performed during the hospital encounter of 11/05/19   CBC w/ Differential   Result Value Ref Range "    WBC 11.7 (H) 4.8 - 10.8 K/uL    RBC 5.63 4.70 - 6.10 M/uL    Hemoglobin 17.3 14.0 - 18.0 g/dL    Hematocrit 51.9 42.0 - 52.0 %    MCV 92.2 81.4 - 97.8 fL    MCH 30.7 27.0 - 33.0 pg    MCHC 33.3 (L) 33.7 - 35.3 g/dL    RDW 40.6 35.9 - 50.0 fL    Platelet Count 252 164 - 446 K/uL    MPV 10.7 9.0 - 12.9 fL    Neutrophils-Polys 75.70 (H) 44.00 - 72.00 %    Lymphocytes 20.20 (L) 22.00 - 41.00 %    Monocytes 2.20 0.00 - 13.40 %    Eosinophils 0.50 0.00 - 6.90 %    Basophils 0.50 0.00 - 1.80 %    Immature Granulocytes 0.90 0.00 - 0.90 %    Nucleated RBC 0.00 /100 WBC    Neutrophils (Absolute) 8.82 (H) 1.82 - 7.42 K/uL    Lymphs (Absolute) 2.35 1.00 - 4.80 K/uL    Monos (Absolute) 0.26 0.00 - 0.85 K/uL    Eos (Absolute) 0.06 0.00 - 0.51 K/uL    Baso (Absolute) 0.06 0.00 - 0.12 K/uL    Immature Granulocytes (abs) 0.10 0.00 - 0.11 K/uL    NRBC (Absolute) 0.00 K/uL   Complete Metabolic Panel (CMP)   Result Value Ref Range    Sodium 134 (L) 135 - 145 mmol/L    Potassium 4.4 3.6 - 5.5 mmol/L    Chloride 101 96 - 112 mmol/L    Co2 22 20 - 33 mmol/L    Anion Gap 11.0 0.0 - 11.9    Glucose 363 (H) 65 - 99 mg/dL    Bun 15 8 - 22 mg/dL    Creatinine 1.05 0.50 - 1.40 mg/dL    Calcium 9.9 8.5 - 10.5 mg/dL    AST(SGOT) 16 12 - 45 U/L    ALT(SGPT) 18 2 - 50 U/L    Alkaline Phosphatase 74 30 - 99 U/L    Total Bilirubin 0.4 0.1 - 1.5 mg/dL    Albumin 4.8 3.2 - 4.9 g/dL    Total Protein 7.9 6.0 - 8.2 g/dL    Globulin 3.1 1.9 - 3.5 g/dL    A-G Ratio 1.5 g/dL   Troponin STAT   Result Value Ref Range    Troponin T <6 6 - 19 ng/L   proBrain Natriuretic Peptide, NT   Result Value Ref Range    NT-proBNP <5 0 - 125 pg/mL   ESTIMATED GFR   Result Value Ref Range    GFR If African American >60 >60 mL/min/1.73 m 2    GFR If Non African American >60 >60 mL/min/1.73 m 2   TSH   Result Value Ref Range    TSH 0.960 0.380 - 5.330 uIU/mL   FREE THYROXINE   Result Value Ref Range    Free T-4 0.94 0.53 - 1.43 ng/dL   D-DIMER   Result Value Ref Range     D-Dimer Screen <0.40 0.00 - 0.50 ug/mL (FEU)   EKG (NOW)   Result Value Ref Range    Report       [facility] Emergency Dept.    Test Date:  2019  Pt Name:    EUGENE AVILES                Department: ER  MRN:        [id number]                      Room:        03  Gender:     Male                         Technician: [id number]  :        1990                   Requested By:ER TRIAGE PROTOCOL  Order #:    [id number]                    Reading MD: RONNIE RAMIREZ MD    Measurements  Intervals                                Axis  Rate:       74                           P:          40  VA:         140                          QRS:        17  QRSD:       94                           T:          2  QT:         372  QTc:        413    Interpretive Statements  SINUS RHYTHM  Compared to ECG 2019 11:03:08  No significant changes  Electronically Signed On 2019 12:36:21 PST by RONNIE RAMIREZ MD     EKG   Result Value Ref Range    Report       [facility] Emergency Dept.    Test Date:  2019  Pt Name:    EUGENE AVILES                Department: ER  MRN:        [id number]                      Room:        03  Gender:     Male                         Technician: [id number]  :        1990                   Requested By:RONNIE RAMIREZ  Order #:    [id number]                    Reading MD: RONNIE RAMIREZ MD    Measurements  Intervals                                Axis  Rate:       103                          P:          33  VA:         124                          QRS:        -9  QRSD:       84                           T:          5  QT:         336  QTc:        440    Interpretive Statements  SINUS TACHYCARDIA  Compared to ECG 2019 12:09:45  Sinus rhythm no longer present  Electronically Signed On 2019 16:34:09 PST by RONNIE RAMIREZ MD          RADIOLOGY  DX-CHEST-2 VIEWS   Final Result      1.  Unremarkable two view chest.         The radiologist's interpretation of all radiological studies have been reviewed by me.      COURSE & MEDICAL DECISION MAKING  Pertinent Labs & Imaging studies reviewed. (See chart for details)    I reviewed the patient's medical records which showed a history of diabetes, hypertension, hyperlipidemia, and asthma. He was seen in August and admitted to the hospital for shortness of breath. At that time he was negative for troponins and had a negative workup for PE. A stress test was negative for ischemia. Pulmonary was consulted and thought his condition was not an asthma exacerbation.     12:26 PM - Patient seen and examined at bedside. We discussed that given he is having these symptoms and has had a full heart workup it is likely he is having sleep apnea. We discussed the plan to evaluate his heart as well as evaluate for possible pneumonia. Patient will be treated with Duoneb and Ativan injection 0.5 mg. Ordered EKG, DX chest, CBC w/, CMP, Troponin, and proBrain natriuretic  to evaluate his symptoms. The differential diagnoses include but are not limited to: Anxiety, chest wall pain, reactive airway disease, asthma.    1:13 PM - The patient's labs have been returned and reviewed.     1:31 PM - The patient's radiology has been returned and reviewed.    1:47 PM - Patient was reevaluated at bedside. Discussed lab and radiology results with the patietn and informed them of the findings. He states that he does not feel improved after the breathing treatment. I informed him that I will be giving him more medication considering his elevated heart rate. He states that the anxiety medication did not help him.     1:50 PM - I have ordered Ativan injection 1mg.    3:04 PM Recheck: Patient re-evaluated at beside. Patient reports feeling slightly improved. Discussed patient's condition and treatment plan. We discussed his labs and radiology. Discussed the need to have a sleep study, until then, I recommended finding sleep  aid devices at the store. The patient understood and is in agreement. He is to be discharged. The patient expresses interest in a home nebulizer. He is to be discharged after interventions.     3:30 PM - Prior to the patient's discharge he felt more tachycardic. I informed him of the need for more testing and that admission is likely. I have ordered D-Dimer, TSH, Free thyroxine, and Urine drug screen to evaluate the patient.     4:09 PM Recheck: Patient re-evaluated at [facility]. Patient reports feeling weak and short of breath. Discussed patient's condition and treatment plan. The patient understood and is in agreement.       Decision Making:  Patient is coming in with chest pain, shortness of breath, wheezing, has been on steroids, antibiotics.  Patient had extensive work-up previously.  Including a echocardiogram, stress test, CT PE study.  Patient continues with chest pain or shortness of breath, with exertion, could possibly be anxiety, attempted some anxiety medicine, give the patient a breathing treatment.  Reevaluation shows the patient shortness of breath is getting worse, anxiety is getting worse, patient is more tachycardic.  Due to this I discussed the case with Dr. Rader for admission the hospital.      DISPOSITION:      DISPOSITION:  Patient will be admitted to Dr. Rader in guarded condition.      FINAL IMPRESSION  1. Moderate persistent asthma with acute exacerbation    2. Chest pain, unspecified type    3. Situational anxiety          Shlomo WALLER (Reddy), am scribing for, and in the presence of, Omer Echeverria M.D..    Electronically signed by: Shlomo Guerrero (Reddy), 11/5/2019    Omer WALLER M.D. personally performed the services described in this documentation, as scribed by Shlomo Guerrero in my presence, and it is both accurate and complete. C    The note accurately reflects work and decisions made by me.  Omer Echeverria  11/5/2019  5:33 PM

## 2019-11-05 NOTE — DISCHARGE PLANNING
Received Choice form at [id number]  Agency/Facility Name: [facility]  Referral sent per Choice form @ 9700 via manual fax.

## 2019-11-05 NOTE — DISCHARGE PLANNING
note:  Md asked CM to get pt a nebulizer.  Pt has [facility] and lives in [place], [place]  Faxed choice to [facility] for [facility] in [place]  Talked to Russell at [facility] @ [phone] who said that she would just need the referral faxed to her and they have the nebulizer and can be delivered to pt's home.   Talked to pt and wife. They are agreeable and will go home then.   RN aware.   Madeline ONEAL received the choice and will fax the referral to [facility] in [place] fax # [phone].

## 2019-11-05 NOTE — ED NOTES
Med Rec complete per Pt at bedside  Allergies Reviewed    Pt started a 10 day course of AMOXIL on 10/30    Pt completed a 5 day course of PREDNISONE on 11/05

## 2019-11-06 ENCOUNTER — PATIENT OUTREACH (OUTPATIENT)
Dept: HEALTH INFORMATION MANAGEMENT | Facility: OTHER | Age: 29
End: 2019-11-06

## 2019-11-06 VITALS
SYSTOLIC BLOOD PRESSURE: 157 MMHG | RESPIRATION RATE: 16 BRPM | OXYGEN SATURATION: 95 % | TEMPERATURE: 97.2 F | HEIGHT: 69 IN | DIASTOLIC BLOOD PRESSURE: 72 MMHG | HEART RATE: 78 BPM | WEIGHT: 220.46 LBS | BODY MASS INDEX: 32.65 KG/M2

## 2019-11-06 PROBLEM — J45.41 MODERATE PERSISTENT ASTHMA WITH ACUTE EXACERBATION: Status: RESOLVED | Noted: 2019-11-05 | Resolved: 2019-11-06

## 2019-11-06 PROBLEM — R07.81 PLEURITIC CHEST PAIN: Status: RESOLVED | Noted: 2019-11-05 | Resolved: 2019-11-06

## 2019-11-06 LAB
ANION GAP SERPL CALC-SCNC: 12 MMOL/L (ref 0–11.9)
BASOPHILS # BLD AUTO: 0.2 % (ref 0–1.8)
BASOPHILS # BLD: 0.02 K/UL (ref 0–0.12)
BUN SERPL-MCNC: 21 MG/DL (ref 8–22)
CALCIUM SERPL-MCNC: 9.6 MG/DL (ref 8.5–10.5)
CHLORIDE SERPL-SCNC: 96 MMOL/L (ref 96–112)
CO2 SERPL-SCNC: 20 MMOL/L (ref 20–33)
CREAT SERPL-MCNC: 0.95 MG/DL (ref 0.5–1.4)
EOSINOPHIL # BLD AUTO: 0.01 K/UL (ref 0–0.51)
EOSINOPHIL NFR BLD: 0.1 % (ref 0–6.9)
ERYTHROCYTE [DISTWIDTH] IN BLOOD BY AUTOMATED COUNT: 40.4 FL (ref 35.9–50)
FLUAV RNA SPEC QL NAA+PROBE: NEGATIVE
FLUBV RNA SPEC QL NAA+PROBE: NEGATIVE
GLUCOSE BLD-MCNC: 238 MG/DL (ref 65–99)
GLUCOSE SERPL-MCNC: 363 MG/DL (ref 65–99)
HCT VFR BLD AUTO: 47.2 % (ref 42–52)
HGB BLD-MCNC: 16 G/DL (ref 14–18)
IMM GRANULOCYTES # BLD AUTO: 0.07 K/UL (ref 0–0.11)
IMM GRANULOCYTES NFR BLD AUTO: 0.6 % (ref 0–0.9)
LYMPHOCYTES # BLD AUTO: 1.02 K/UL (ref 1–4.8)
LYMPHOCYTES NFR BLD: 8.1 % (ref 22–41)
MCH RBC QN AUTO: 31.1 PG (ref 27–33)
MCHC RBC AUTO-ENTMCNC: 33.9 G/DL (ref 33.7–35.3)
MCV RBC AUTO: 91.7 FL (ref 81.4–97.8)
MONOCYTES # BLD AUTO: 0.07 K/UL (ref 0–0.85)
MONOCYTES NFR BLD AUTO: 0.6 % (ref 0–13.4)
NEUTROPHILS # BLD AUTO: 11.42 K/UL (ref 1.82–7.42)
NEUTROPHILS NFR BLD: 90.4 % (ref 44–72)
NRBC # BLD AUTO: 0 K/UL
NRBC BLD-RTO: 0 /100 WBC
PLATELET # BLD AUTO: 220 K/UL (ref 164–446)
PMV BLD AUTO: 10.3 FL (ref 9–12.9)
POTASSIUM SERPL-SCNC: 4.6 MMOL/L (ref 3.6–5.5)
PROCALCITONIN SERPL-MCNC: <0.05 NG/ML
RBC # BLD AUTO: 5.15 M/UL (ref 4.7–6.1)
SODIUM SERPL-SCNC: 128 MMOL/L (ref 135–145)
TROPONIN T SERPL-MCNC: <6 NG/L (ref 6–19)
WBC # BLD AUTO: 12.6 K/UL (ref 4.8–10.8)

## 2019-11-06 PROCEDURE — 85025 COMPLETE CBC W/AUTO DIFF WBC: CPT

## 2019-11-06 PROCEDURE — A9270 NON-COVERED ITEM OR SERVICE: HCPCS | Performed by: HOSPITALIST

## 2019-11-06 PROCEDURE — 96372 THER/PROPH/DIAG INJ SC/IM: CPT

## 2019-11-06 PROCEDURE — 700105 HCHG RX REV CODE 258: Performed by: INTERNAL MEDICINE

## 2019-11-06 PROCEDURE — 96375 TX/PRO/DX INJ NEW DRUG ADDON: CPT

## 2019-11-06 PROCEDURE — 36415 COLL VENOUS BLD VENIPUNCTURE: CPT

## 2019-11-06 PROCEDURE — 96376 TX/PRO/DX INJ SAME DRUG ADON: CPT

## 2019-11-06 PROCEDURE — 82962 GLUCOSE BLOOD TEST: CPT

## 2019-11-06 PROCEDURE — 700101 HCHG RX REV CODE 250: Performed by: INTERNAL MEDICINE

## 2019-11-06 PROCEDURE — 99238 HOSP IP/OBS DSCHRG MGMT 30/<: CPT | Performed by: INTERNAL MEDICINE

## 2019-11-06 PROCEDURE — 84145 PROCALCITONIN (PCT): CPT

## 2019-11-06 PROCEDURE — 700102 HCHG RX REV CODE 250 W/ 637 OVERRIDE(OP): Performed by: INTERNAL MEDICINE

## 2019-11-06 PROCEDURE — A9270 NON-COVERED ITEM OR SERVICE: HCPCS | Performed by: INTERNAL MEDICINE

## 2019-11-06 PROCEDURE — 700102 HCHG RX REV CODE 250 W/ 637 OVERRIDE(OP): Performed by: HOSPITALIST

## 2019-11-06 PROCEDURE — 700111 HCHG RX REV CODE 636 W/ 250 OVERRIDE (IP): Performed by: INTERNAL MEDICINE

## 2019-11-06 PROCEDURE — 94640 AIRWAY INHALATION TREATMENT: CPT

## 2019-11-06 PROCEDURE — 80048 BASIC METABOLIC PNL TOTAL CA: CPT

## 2019-11-06 PROCEDURE — G0378 HOSPITAL OBSERVATION PER HR: HCPCS

## 2019-11-06 PROCEDURE — 84484 ASSAY OF TROPONIN QUANT: CPT

## 2019-11-06 RX ORDER — HYDROXYZINE 50 MG/1
50 TABLET, FILM COATED ORAL 3 TIMES DAILY PRN
Qty: 30 TAB | Refills: 0 | Status: SHIPPED | OUTPATIENT
Start: 2019-11-06

## 2019-11-06 RX ORDER — BUDESONIDE AND FORMOTEROL FUMARATE DIHYDRATE 160; 4.5 UG/1; UG/1
2 AEROSOL RESPIRATORY (INHALATION) 2 TIMES DAILY
Qty: 1 INHALER | Refills: 3 | Status: SHIPPED | OUTPATIENT
Start: 2019-11-06

## 2019-11-06 RX ORDER — TRAMADOL HYDROCHLORIDE 50 MG/1
50 TABLET ORAL ONCE
Status: COMPLETED | OUTPATIENT
Start: 2019-11-06 | End: 2019-11-06

## 2019-11-06 RX ORDER — ALBUTEROL SULFATE 90 UG/1
2 AEROSOL, METERED RESPIRATORY (INHALATION) EVERY 6 HOURS PRN
Qty: 2 INHALER | Refills: 3 | Status: SHIPPED | OUTPATIENT
Start: 2019-11-06

## 2019-11-06 RX ORDER — METHYLPREDNISOLONE 4 MG/1
TABLET ORAL
Qty: 21 TAB | Refills: 0 | Status: SHIPPED | OUTPATIENT
Start: 2019-11-06

## 2019-11-06 RX ORDER — LEVALBUTEROL INHALATION SOLUTION 0.63 MG/3ML
0.63 SOLUTION RESPIRATORY (INHALATION) EVERY 4 HOURS
Qty: 24 BULLET | Refills: 2 | Status: SHIPPED | OUTPATIENT
Start: 2019-11-06

## 2019-11-06 RX ADMIN — MONTELUKAST 10 MG: 10 TABLET, FILM COATED ORAL at 05:15

## 2019-11-06 RX ADMIN — SENNOSIDES AND DOCUSATE SODIUM 2 TABLET: 8.6; 5 TABLET ORAL at 05:11

## 2019-11-06 RX ADMIN — LEVALBUTEROL HYDROCHLORIDE 0.63 MG: 0.63 SOLUTION RESPIRATORY (INHALATION) at 06:25

## 2019-11-06 RX ADMIN — ONDANSETRON 4 MG: 2 INJECTION INTRAMUSCULAR; INTRAVENOUS at 05:11

## 2019-11-06 RX ADMIN — INSULIN HUMAN 2 UNITS: 100 INJECTION, SOLUTION PARENTERAL at 07:41

## 2019-11-06 RX ADMIN — ATORVASTATIN CALCIUM 10 MG: 10 TABLET, FILM COATED ORAL at 05:10

## 2019-11-06 RX ADMIN — BUDESONIDE AND FORMOTEROL FUMARATE DIHYDRATE 2 PUFF: 160; 4.5 AEROSOL RESPIRATORY (INHALATION) at 05:12

## 2019-11-06 RX ADMIN — PIOGLITAZONE 45 MG: 45 TABLET ORAL at 05:17

## 2019-11-06 RX ADMIN — TRAMADOL HYDROCHLORIDE 50 MG: 50 TABLET, FILM COATED ORAL at 01:05

## 2019-11-06 RX ADMIN — LISINOPRIL 2.5 MG: 2.5 TABLET ORAL at 05:14

## 2019-11-06 RX ADMIN — ACETAMINOPHEN 650 MG: 325 TABLET, FILM COATED ORAL at 08:58

## 2019-11-06 RX ADMIN — SODIUM CHLORIDE, POTASSIUM CHLORIDE, SODIUM LACTATE AND CALCIUM CHLORIDE: 600; 310; 30; 20 INJECTION, SOLUTION INTRAVENOUS at 03:02

## 2019-11-06 RX ADMIN — LEVALBUTEROL HYDROCHLORIDE 0.63 MG: 0.63 SOLUTION RESPIRATORY (INHALATION) at 02:25

## 2019-11-06 RX ADMIN — METHYLPREDNISOLONE SODIUM SUCCINATE 40 MG: 40 INJECTION, POWDER, FOR SOLUTION INTRAMUSCULAR; INTRAVENOUS at 05:11

## 2019-11-06 NOTE — DISCHARGE PLANNING
Care Transition Team Assessment    Spoke with patient and spouse @ bedside with verbal consent. Lives in [facility]. Uses [facility] in [place], [place]. Has [facility]. Spouse will be ride @ D/C. Pending Nebulizer from ER.    Information Source  Orientation : Oriented x 4  Information Given By: Patient    Readmission Evaluation  Is this a readmission?: No    Interdisciplinary Discharge Planning  Primary Care Physician: Dr Crump  Lives with - Patient's Self Care Capacity: Spouse  Patient or legal guardian wants to designate a caregiver (see row info): No  Support Systems: Spouse / Significant Other  Housing / Facility: [address]  Do You Take your Prescribed Medications Regularly: Yes  Able to Return to Previous ADL's: Yes  Mobility Issues: No  Prior Services: Home-Independent  Patient Expects to be Discharged to:: Home  Assistance Needed: No  Durable Medical Equipment: Not Applicable    Discharge Preparedness  What are your discharge supports?: Spouse    Finances  Prescription Coverage: Yes    Anticipated Discharge Information  Anticipated discharge disposition: Home  Discharge Address: [address] [place] [address]  Discharge Contact Phone Number: [phone]

## 2019-11-06 NOTE — CARE PLAN
Problem: Safety  Goal: Will remain free from injury  Outcome: PROGRESSING AS EXPECTED  Goal: Will remain free from falls  Outcome: PROGRESSING AS EXPECTED     Problem: Pain Management  Goal: Pain level will decrease to patient's comfort goal  Outcome: PROGRESSING AS EXPECTED     Problem: Skin Integrity  Goal: Risk for impaired skin integrity will decrease  Outcome: PROGRESSING AS EXPECTED     Problem: Medication  Goal: Compliance with prescribed medication will improve  Outcome: PROGRESSING AS EXPECTED     Problem: Knowledge Deficit  Goal: Knowledge of disease process/condition, treatment plan, diagnostic tests, and medications will improve  Outcome: PROGRESSING AS EXPECTED  Goal: Knowledge of the prescribed therapeutic regimen will improve  Outcome: PROGRESSING AS EXPECTED

## 2019-11-06 NOTE — DISCHARGE INSTRUCTIONS
Discharge Instructions per MARK Mccullough.     You are being prescribed a Medrol Dosepak for the treatment of your acute asthma exacerbation . Please take all medications as prescribed and in completion.    FOLLOW-UP: Please follow-up with your PCP in 1 week.     DIET: Regular     ACTIVITY: No restrictions     DIAGNOSIS: Acute asthma exacerbation    Return to ER if symptoms recur or if you experience chest pain, confusion, cough with blood, or loss of consciousness      Asthma, F.L.A.R.E.  Most people with asthma do not get sick enough that they need emergency care. If you are getting emergency care, it may mean:  · You are not taking your asthma medicine the right way.   · Your doctor has not given you any or enough long-term control medicine.   · Some of your medicines may need to be changed.   · You are around things (triggers) that start your asthma.   F  Follow up with your doctor. Make an appointment to be seen.   · If you have trouble making an appointment, ask to speak to the nurse.   · If you do not have a primary care doctor, call to get one.   At the follow-up appointment:  · Bring all of your medicine and this plan with you.   · Make a plan with your doctor that you can follow every day. This will keep your asthma under control.   · Write down your questions and your doctor's answers.   L  Learn about your asthma medicines. Take your medicine as told by the doctor. Take your medicine even if you start to feel better.  Quick-relief (rescue).  · Kind of medicine:   · Name of medicine:   · How much:   · How often and how long you need to take it:   Long-term control.  · Kind of medicine:   · Name of medicine:   · How much:   · How often and how long you need to take it:   Steroid pills or syrup.  · Kind of medicine:   · Name of medicine:   · How much:   · How often and how long you need to take it:   A  Asthma is a lifelong disease.  Your breathing should get better after getting emergency  care. You still need to get control of your asthma.  · If you use quick-relief medicine more than 2 times a week, then your asthma is not under control. You need to see your doctor or an asthma specialist to make a plan to get control of your asthma.   · Take long-term control medicine every day as told by your doctor.   · Figure out what things make your asthma worse. Stay away from these things.   R  Respond to these warning signs that your asthma is getting worse:  · Your chest feels tight.   · You are short of breath.   · You are making whistling sounds when you breathe (wheezing).   · You are coughing.   · Your peak flow is getting low.   Keep taking your medicines as told and call your doctor.  E  Emergency care may be needed if:  · You have trouble talking, breathing, or you start to make whistling sounds when you breathe.   · You are working hard to breathe. You may see skin sucking in at the rib cage or above the breastbone.   · You need to use quick-relief medicine more than every 4 hours.   · You see your peak flow dropping.   Take your quick-relief medicine and wait 20 minutes. If you do not feel better, take it again and wait 20 minutes. If you still do not feel better, take it again and call your local emergency services (911 in [place] right away.  Document Released: 09/26/2009 Document Revised: 03/11/2013 Document Reviewed: 09/26/2009  ExitCare® Patient Information ©2013 [employer]..           Asthma, Adult  Asthma is a condition of the lungs in which the airways tighten and narrow. Asthma can make it hard to breathe. Asthma cannot be cured, but medicine and lifestyle changes can help control it. Asthma may be started (triggered) by:  · Animal skin flakes (dander).  · Dust.  · Cockroaches.  · Pollen.  · Mold.  · Smoke.  · Cleaning products.  · Hair sprays or aerosol sprays.  · Paint fumes or strong smells.  · Cold air, weather changes, and winds.  · Crying or laughing hard.  · Stress.  · Certain  medicines or drugs.  · Foods, such as dried fruit, potato chips, and sparkling grape juice.  · Infections or conditions (colds, flu).  · Exercise.  · Certain medical conditions or diseases.  · Exercise or tiring activities.  Follow these instructions at home:  · Take medicine as told by your doctor.  · Use a peak flow meter as told by your doctor. A peak flow meter is a tool that measures how well the lungs are working.  · Record and keep track of the peak flow meter's readings.  · Understand and use the asthma action plan. An asthma action plan is a written plan for taking care of your asthma and treating your attacks.  · To help prevent asthma attacks:  ¨ Do not smoke. Stay away from secondhand smoke.  ¨ Change your heating and air conditioning filter often.  ¨ Limit your use of fireplaces and wood stoves.  ¨ Get rid of pests (such as roaches and mice) and their droppings.  ¨ Throw away plants if you see mold on them.  ¨ Clean your floors. Dust regularly. Use cleaning products that do not smell.  ¨ Have someone vacuum when you are not home. Use a vacuum  with a HEPA filter if possible.  ¨ Replace carpet with wood, tile, or vinyl mary. Carpet can trap animal skin flakes and dust.  ¨ Use allergy-proof pillows, mattress covers, and box spring covers.  ¨ Wash bed sheets and blankets every week in hot water and dry them in a dryer.  ¨ Use blankets that are made of polyester or cotton.  ¨ Clean bathrooms and ladan with bleach. If possible, have someone repaint the walls in these rooms with mold-resistant paint. Keep out of the rooms that are being cleaned and painted.  ¨ Wash hands often.  Contact a doctor if:  · You have make a whistling sound when breaking (wheeze), have shortness of breath, or have a cough even if taking medicine to prevent attacks.  · The colored mucus you cough up (sputum) is thicker than usual.  · The colored mucus you cough up changes from clear or white to yellow, green, gray, or  bloody.  · You have problems from the medicine you are taking such as:  ¨ A rash.  ¨ Itching.  ¨ Swelling.  ¨ Trouble breathing.  · You need reliever medicines more than 2-3 times a week.  · Your peak flow measurement is still at 50-79% of your personal best after following the action plan for 1 hour.  · You have a fever.  Get help right away if:  · You seem to be worse and are not responding to medicine during an asthma attack.  · You are short of breath even at rest.  · You get short of breath when doing very little activity.  · You have trouble eating, drinking, or talking.  · You have chest pain.  · You have a fast heartbeat.  · Your lips or fingernails start to turn blue.  · You are light-headed, dizzy, or faint.  · Your peak flow is less than 50% of your personal best.  This information is not intended to replace advice given to you by your health care provider. Make sure you discuss any questions you have with your health care provider.  Document Released: 06/05/2009 Document Revised: 05/25/2017 Document Reviewed: 07/17/2014  [employer] Interactive Patient Education © 2017 [employer]      Discharge Instructions    Discharged to home by car with relative. Discharged via wheelchair, hospital escort: Yes.  Special equipment needed: Not Applicable    Be sure to schedule a follow-up appointment with your primary care doctor or any specialists as instructed.     Discharge Plan:   Diet Plan: Discussed  Activity Level: Discussed  Confirmed Follow up Appointment: Patient to Call and Schedule Appointment  Confirmed Symptoms Management: Discussed  Medication Reconciliation Updated: Yes  Influenza Vaccine Indication: Patient Refuses    I understand that a diet low in cholesterol, fat, and sodium is recommended for good health. Unless I have been given specific instructions below for another diet, I accept this instruction as my diet prescription.   Other diet: Heart healthy     Special Instructions: None    · Is patient  discharged on Warfarin / Coumadin?   No     Depression / Suicide Risk    As you are discharged from this [facility] facility, it is important to learn how to keep safe from harming yourself.    Recognize the warning signs:  · Abrupt changes in personality, positive or negative- including increase in energy   · Giving away possessions  · Change in eating patterns- significant weight changes-  positive or negative  · Change in sleeping patterns- unable to sleep or sleeping all the time   · Unwillingness or inability to communicate  · Depression  · Unusual sadness, discouragement and loneliness  · Talk of wanting to die  · Neglect of personal appearance   · Rebelliousness- reckless behavior  · Withdrawal from people/activities they love  · Confusion- inability to concentrate     If you or a loved one observes any of these behaviors or has concerns about self-harm, here's what you can do:  · Talk about it- your feelings and reasons for harming yourself  · Remove any means that you might use to hurt yourself (examples: pills, rope, extension cords, firearm)  · Get professional help from the community (Mental Health, Substance Abuse, psychological counseling)  · Do not be alone:Call your Safe Contact- someone whom you trust who will be there for you.  · Call your local CRISIS HOTLINE 325-8505 or 040-294-1549  · Call your local Children's Mobile Crisis Response Team [place] (283) 523-2928 or www.[employer]  · Call the toll free National Suicide Prevention Hotlines   · National Suicide Prevention Lifeline 076-118-FTBP (3319)  · National Hope Line Network 800-SUICIDE (314-4975)

## 2019-11-06 NOTE — PROGRESS NOTES
Patient brought up from ED by Esperanza RN.  Patient complaining of chest pain/SOB.  Tele: NSR, oxygen: RA.  RT paged to let know of patient being on the unit.  Call light within reach.

## 2019-11-06 NOTE — CARE PLAN
Problem: Safety  Goal: Will remain free from injury  11/5/2019 1917 by Selwyn Vance R.N.  Outcome: PROGRESSING AS EXPECTED  11/5/2019 1837 by Selwyn Vance R.N.  Outcome: PROGRESSING AS EXPECTED  Goal: Will remain free from falls  11/5/2019 1917 by Selwyn Vance R.N.  Outcome: PROGRESSING AS EXPECTED  11/5/2019 1837 by Selwyn Vance R.N.  Outcome: PROGRESSING AS EXPECTED     Problem: Pain Management  Goal: Pain level will decrease to patient's comfort goal  11/5/2019 1917 by Selwyn Vance R.N.  Outcome: PROGRESSING AS EXPECTED  11/5/2019 1837 by Selwyn Vance R.N.  Outcome: PROGRESSING AS EXPECTED     Problem: Skin Integrity  Goal: Risk for impaired skin integrity will decrease  11/5/2019 1917 by Selwyn Vance R.N.  Outcome: PROGRESSING AS EXPECTED  11/5/2019 1837 by Selwyn Vance R.N.  Outcome: PROGRESSING AS EXPECTED     Problem: Medication  Goal: Compliance with prescribed medication will improve  11/5/2019 1917 by Selwyn Vance R.N.  Outcome: PROGRESSING AS EXPECTED  11/5/2019 1837 by Selwyn Vance R.N.  Outcome: PROGRESSING AS EXPECTED     Problem: Knowledge Deficit  Goal: Knowledge of disease process/condition, treatment plan, diagnostic tests, and medications will improve  11/5/2019 1917 by Selwyn Vance R.N.  Outcome: PROGRESSING AS EXPECTED  11/5/2019 1837 by Selwyn Vance R.N.  Outcome: PROGRESSING AS EXPECTED  Goal: Knowledge of the prescribed therapeutic regimen will improve  11/5/2019 1917 by Selwyn Vance R.N.  Outcome: PROGRESSING AS EXPECTED  11/5/2019 1837 by Selwyn Vance R.N.  Outcome: PROGRESSING AS EXPECTED

## 2019-11-06 NOTE — FLOWSHEET NOTE
11/06/19 0845   Interdisciplinary Plan of Care-Goals (Indications)   Bronchodilator Indications History / Diagnosis   Interdisciplinary Plan of Care-Outcomes    Bronchodilator Outcome Patient at Stable Baseline   Education   Education Yes - Pt. / Family has been Instructed in use of Respiratory Equipment   RT Assessment of Delivered Medications   Evaluation of Medication Delivery Daily Yes-- Pt /Family has been Instructed in use of Respiratory Medications and Adverse Reactions   SVN Group   #SVN Performed Yes   Given By: Mouthpiece   Respiratory WDL   Respiratory (WDL) X   Chest Exam   Work Of Breathing / Effort Mild   Respiration 16   Pulse 78   Breath Sounds   Pre/Post Intervention Pre Intervention Assessment   RUL Breath Sounds Clear   RML Breath Sounds Diminished   RLL Breath Sounds Diminished   CANDICE Breath Sounds Clear   LLL Breath Sounds Diminished   Oximetry   Continuous Oximetry Yes   Oxygen   Pulse Oximetry 95 %   O2 (LPM) 0   O2 Daily Delivery Respiratory  Room Air with O2 Available

## 2019-11-06 NOTE — ASSESSMENT & PLAN NOTE
-Likely related to asthma exacerbation, with component of musculoskeletal pain with reproducible chest tenderness on exam.  Doubt cardiac chest pain, as he has had a negative cardiac stress test back in August.  CT angiogram at that time also did not show any PE, or any acute lung pathology.  -Management of asthma exacerbation as above.  -Monitor on telemetry.  Trend troponins.

## 2019-11-06 NOTE — CARE PLAN
Problem: Bronchoconstriction:  Goal: Improve in air movement and diminished wheezing  Outcome: PROGRESSING AS EXPECTED   Q4H Xopenex, BID Symbicort.

## 2019-11-06 NOTE — ASSESSMENT & PLAN NOTE
-I will resume his home lisinopril.  Monitor blood pressure trend closely, with as needed IV labetalol and Vasotec, and oral clonidine for significant hypertension.

## 2019-11-06 NOTE — PROGRESS NOTES
Assessment completed.  Pt A&Ox4.  Respirations even, unlabored on room air, diminished lung sound to RML, RLL, and LLL.  Pt complains of 7/10 chest pain with deep inspiration and expiration, medicated per MAR.  Sinus rhythm noted on telemetry monitor.  POC discussed;  communication board updated.    Bed in locked, lowest position.  Call light and belongings within reach.  Needs met, will continue to monitor.

## 2019-11-06 NOTE — DISCHARGE SUMMARY
"Discharge Summary    CHIEF COMPLAINT ON ADMISSION  Chief Complaint   Patient presents with   • Shortness of Breath   • Chest Pain       Reason for Admission  shortness of breath; CP     Admission Date  11/5/2019    CODE STATUS  Full Code    HPI & HOSPITAL COURSE  Manan Ruffin is a 29 y.o. male with type 2 diabetes mellitus, hypertension, hyperlipidemia, and persistent asthma, who was admitted in August 2019 for asthma exacerbation, and at that time presented with chest pain for which he had a nuclear cardiac stress test which was negative.  At that time he also had a CT angiogram of the chest which did not show any PE, or any lung pathology.  His echocardiogram at that time was normal as well.  He was treated with IV steroids, and started on inhalers including Symbicort and Singulair.  He stays he felt better for about a couple of weeks, but then his symptoms recurred with shortness of breath with short walks/exertion, along with wheezing, with associated pleuritic chest pain which she further described as tightness, worse with deep breathing and coughing.  He also felt pressure in his head, and \"ears will not pop\".  He also started to have body soreness, and fatigue, and diffuse body aches, including cramps on the legs.  He also admits to cough productive of clear phlegm, occasionally greenish.  He denies any fevers or chills, but does feel cold.  He denies any leg swelling.  His symptoms were more pronounced since 2 weeks ago, and due to the persistence of his symptoms he decided to go to the ED. He was admitted to the CDU for IV steroids, RT protocol and IVF. He did well overnight and reported improved respiratory effort this morning and resolution of wheezing. He was hemodynamically stable upon discharged. He was given a Medrol Dosepak and nebulizer and instructed to follow-up with his PCP in [place], [place].  I recommended patient also follow-up with a pulmonologist ASAP. I told the patient I was not able to " schedule an appointment with a [facility] specialist, since he had [place] insurance. I also instructed him on the signs and symptoms that warrant an immediate ED visit and when to call his PCP.           Therefore, he is discharged in good and stable condition to home with close outpatient follow-up.    Discharge Date  11/06/19    FOLLOW UP ITEMS POST DISCHARGE  Follow-up with PCP    DISCHARGE DIAGNOSES  Principal Problem (Resolved):    Moderate persistent asthma with acute exacerbation POA: Yes  Active Problems:    Type 2 diabetes mellitus (HCC) (Chronic) POA: Yes    Dyslipidemia POA: Yes    Hypertension POA: Yes  Resolved Problems:    Pleuritic chest pain POA: Yes      FOLLOW UP  No future appointments.  [facility], Emergency Dept  [address] [id number] [phone]  Go to  If symptoms worsen      MEDICATIONS ON DISCHARGE     Medication List      START taking these medications      Instructions   hydrOXYzine HCl 50 MG Tabs  Commonly known as:  ATARAX   Take 1 Tab by mouth 3 times a day as needed for Anxiety.  Dose:  50 mg     levalbuterol 0.63 MG/3ML Nebu  Commonly known as:  XOPENEX   3 mL by Nebulization route every 4 hours.  Dose:  0.63 mg     methylPREDNISolone 4 MG Tbpk  Commonly known as:  MEDROL DOSEPAK   Follow schedule on package instructions.        CONTINUE taking these medications      Instructions   albuterol 108 (90 Base) MCG/ACT Aers inhalation aerosol   Inhale 2 Puffs by mouth every 6 hours as needed for Shortness of Breath.  Dose:  2 Puff     Alogliptin Benzoate 25 MG Tabs   Take 25 mg by mouth every morning.  Dose:  25 mg     amoxicillin 250 MG Caps  Commonly known as:  AMOXIL   Take 250 mg by mouth 2 Times a Day. Pt started a 10 day course of AMOXIL on 10/30  Dose:  250 mg     atorvastatin 10 MG Tabs  Commonly known as:  LIPITOR   Take 10 mg by mouth every day.  Dose:  10 mg     budesonide-formoterol 160-4.5 MCG/ACT Aero  Commonly known as:  SYMBICORT    Inhale 2 Puffs by mouth 2 Times a Day.  Dose:  2 Puff     Ertugliflozin L-PyroglutamicAc 15 MG Tabs  Commonly known as:  STEGLATRO   Take 15 mg by mouth every morning.  Dose:  15 mg     lisinopril 2.5 MG Tabs  Commonly known as:  PRINIVIL   Take 2.5 mg by mouth every morning.  Dose:  2.5 mg     montelukast 10 MG Tabs  Commonly known as:  SINGULAIR   Take 1 Tab by mouth every day.  Dose:  10 mg     pioglitazone 45 MG Tabs  Commonly known as:  ACTOS   Take 45 mg by mouth every morning.  Dose:  45 mg        STOP taking these medications    predniSONE 10 MG Tabs  Commonly known as:  DELTASONE            Allergies  Allergies   Allergen Reactions   • Asa [Aspirin]      Swollen throat    • Bloodless    • Ibuprofen      Swollen throat   • Tomato        DIET  Orders Placed This Encounter   Procedures   • Diet Order Diabetic     Standing Status:   Standing     Number of Occurrences:   1     Order Specific Question:   Diet:     Answer:   Diabetic [3]       ACTIVITY  As tolerated.  Weight bearing as tolerated    CONSULTATIONS  None    PROCEDURES  None    LABORATORY  Lab Results   Component Value Date    SODIUM 128 (L) 11/06/2019    POTASSIUM 4.6 11/06/2019    CHLORIDE 96 11/06/2019    CO2 20 11/06/2019    GLUCOSE 363 (H) 11/06/2019    BUN 21 11/06/2019    CREATININE 0.95 11/06/2019        Lab Results   Component Value Date    WBC 12.6 (H) 11/06/2019    HEMOGLOBIN 16.0 11/06/2019    HEMATOCRIT 47.2 11/06/2019    PLATELETCT 220 11/06/2019

## 2019-11-06 NOTE — PROGRESS NOTES
Pt complained that he was having chest tightness similar to when he has an asthma attack.  The pt only had Tylenol ordered on his MAR for pain, so he asked if I could call the doctor to order something stronger.  I paged Dr. Juarez and she put in an order for Tramadol.  If pt has continued chest pain, Dr. Juarez wants to an EKG, and notify her accordingly for anything of concern.

## 2019-11-06 NOTE — PROGRESS NOTES
IV dc'd.  Discharge instructions given to patient; patient verbalizes understanding, all questions answered.  Copy of DC summary provided, signed copy in chart.  6 prescriptions electronically sent to pt's pharmacy.  Pt awaiting ride home

## 2019-11-06 NOTE — ASSESSMENT & PLAN NOTE
-I will resume his home oral hypoglycemic agents, and put him on sliding scale insulin coverage while in-house. Accu-Cheks before meals and at bedtime. Goal to keep BG between 140-180 per 2019 ADA guidelines.  Watch blood glucose closely especially that he will be on steroids.

## 2019-11-06 NOTE — H&P
"Hospital Medicine History & Physical Note    Date of Service  11/5/2019    Primary Care Physician  No primary care provider on file.    Consultants  none    Code Status  Full    Chief Complaint  Chest pain, diffuse body aches, shortness of breath    History of Presenting Illness  29 y.o. male with type 2 diabetes mellitus, hypertension, hyperlipidemia, and persistent asthma, who was admitted in August 2019 for asthma exacerbation, and at that time presented with chest pain for which he had a nuclear cardiac stress test which was negative.  At that time he also had a CT angiogram of the chest which did not show any PE, or any lung pathology.  His echocardiogram at that time was normal as well.  He was treated with IV steroids, and started on inhalers including Symbicort and Singulair.  He states he felt better for about a couple of weeks, but then his symptoms recurred with shortness of breath with short walks/exertion, along with wheezing, with associated pleuritic chest pain which she further described as tightness, worse with deep breathing and coughing.  He also feels pressure in his head, and \"ears will not pop\".  He also started to have body soreness, and fatigue, and diffuse body aches, including cramps on the legs.  He also admits to cough productive of clear phlegm, occasionally greenish.  He denies any fevers or chills, but does feel cold.  He denies any leg swelling.  His symptoms were more pronounced since 2 weeks ago, and due to the persistence of his symptoms he decided to go to the ED today.    Notably, patient states that he has had a PFT done as outpatient, and he was only \"putting out 60%\".  He is compliant with his inhalers.  He denies any sick contacts.  He had a recent travel to [place] at his mom's house over a weekend, but was not exposed to farm animals, or birds.  He did not go to a desert.  He has not had his flu shot this year.    ED course:  The patient was initially evaluated.  He was " tachycardic.  Initial blood work-up showed WBC of 11,700, with no bandemia.  BMP was remarkable for sodium of 134, and blood glucose of 363.  Troponin and BNP were low.  D-dimer was low as well.  TSH was normal.  Chest x-ray (personally reviewed) did not show any infiltrates or consolidation.  EKG (personally reviewed) showed sinus tachycardia, without any ischemic changes.  He was noted to be wheezing on evaluation.  Patient was subsequent admitted to the hospitalist service for further evaluation and management.      Review of Systems  ROS     Pertinent positives/negatives as mentioned above.     A complete review of systems was personally done by me. All other systems were negative.       Past Medical History   has a past medical history of Asthma, Diabetes (HCC), High cholesterol, and Hypertension.    Surgical History  Reviewed and not pertinent.       Family History  Reviewed and not pertinent.       Social History   reports that he has never smoked. He has never used smokeless tobacco. He reports current alcohol use. He reports previous drug use.    Allergies  Allergies   Allergen Reactions   • Asa [Aspirin]      Swollen throat    • Bloodless    • Ibuprofen      Swollen throat   • Tomato        Medications  Prior to Admission Medications   Prescriptions Last Dose Informant Patient Reported? Taking?   Alogliptin Benzoate 25 MG Tab 11/4/2019 at AM Patient Yes No   Sig: Take 25 mg by mouth every morning.   Ertugliflozin L-PyroglutamicAc (STEGLATRO) 15 MG Tab 11/4/2019 at AM Patient Yes No   Sig: Take 15 mg by mouth every morning.   albuterol 108 (90 Base) MCG/ACT Aero Soln inhalation aerosol 11/5/2019 at AM Patient Yes Yes   Sig: Inhale 2 Puffs by mouth every 6 hours as needed for Shortness of Breath.   amoxicillin (AMOXIL) 250 MG Cap 11/4/2019 at PM Patient Yes Yes   Sig: Take 250 mg by mouth 2 Times a Day. Pt started a 10 day course of AMOXIL on 10/30   atorvastatin (LIPITOR) 10 MG Tab 11/4/2019 at AM Patient  Yes No   Sig: Take 10 mg by mouth every day.   budesonide-formoterol (SYMBICORT) 160-4.5 MCG/ACT Aerosol 11/4/2019 at AM Patient No No   Sig: Inhale 2 Puffs by mouth 2 Times a Day.   lisinopril (PRINIVIL) 2.5 MG Tab 11/4/2019 at AM Patient Yes No   Sig: Take 2.5 mg by mouth every morning.   montelukast (SINGULAIR) 10 MG Tab 11/4/2019 at AM Patient No No   Sig: Take 1 Tab by mouth every day.   pioglitazone (ACTOS) 45 MG Tab 11/4/2019 at AM Patient Yes No   Sig: Take 45 mg by mouth every morning.   predniSONE (DELTASONE) 10 MG Tab 11/5/2019 at Complete Patient Yes Yes   Sig: Take 10 mg by mouth every day. Pt completed a 5 day course of PREDNISONE on 11/05      Facility-Administered Medications: None       Physical Exam  Temp:  [36.6 °C (97.9 °F)] 36.6 °C (97.9 °F)  Pulse:  [] 118  Resp:  [15-22] 15  BP: (128-139)/(79-94) 128/79  SpO2:  [93 %-97 %] 97 %    Physical Exam  Vitals signs reviewed.   Constitutional:       General: He is not in acute distress.     Appearance: Normal appearance. He is normal weight. He is not toxic-appearing or diaphoretic.   HENT:      Head: Normocephalic and atraumatic.      Mouth/Throat:      Mouth: Mucous membranes are moist.      Pharynx: No oropharyngeal exudate.   Eyes:      General: No scleral icterus.     Extraocular Movements: Extraocular movements intact.      Conjunctiva/sclera: Conjunctivae normal.      Pupils: Pupils are equal, round, and reactive to light.   Neck:      Musculoskeletal: Normal range of motion and neck supple.   Cardiovascular:      Rate and Rhythm: Regular rhythm. Tachycardia present.      Heart sounds: Normal heart sounds. No murmur. No gallop.    Pulmonary:      Effort: Pulmonary effort is normal. No respiratory distress.      Breath sounds: No stridor. No rhonchi or rales.      Comments: Diminished air entry B/L bases, with occasional bibasilar wheezing.  Chest:      Chest wall: Tenderness ( Anterior chest wall) present.   Abdominal:      General: Bowel  sounds are normal. There is no distension.      Palpations: Abdomen is soft. There is no mass.      Tenderness: There is no tenderness. There is no guarding or rebound.   Musculoskeletal: Normal range of motion.         General: No swelling.   Lymphadenopathy:      Cervical: No cervical adenopathy.   Skin:     General: Skin is warm and dry.      Coloration: Skin is not jaundiced.      Findings: No rash.   Neurological:      General: No focal deficit present.      Mental Status: He is alert and oriented to person, place, and time.      Cranial Nerves: No cranial nerve deficit.   Psychiatric:         Mood and Affect: Mood normal.         Behavior: Behavior normal.         Thought Content: Thought content normal.         Judgment: Judgment normal.         Laboratory:  Recent Labs     11/05/19  1221   WBC 11.7*   RBC 5.63   HEMOGLOBIN 17.3   HEMATOCRIT 51.9   MCV 92.2   MCH 30.7   MCHC 33.3*   RDW 40.6   PLATELETCT 252   MPV 10.7     Recent Labs     11/05/19  1221   SODIUM 134*   POTASSIUM 4.4   CHLORIDE 101   CO2 22   GLUCOSE 363*   BUN 15   CREATININE 1.05   CALCIUM 9.9     Recent Labs     11/05/19  1221   ALTSGPT 18   ASTSGOT 16   ALKPHOSPHAT 74   TBILIRUBIN 0.4   GLUCOSE 363*         Recent Labs     11/05/19  1221   NTPROBNP <5         Recent Labs     11/05/19  1221   TROPONINT <6       Urinalysis:    No results found     Imaging:  DX-CHEST-2 VIEWS   Final Result      1.  Unremarkable two view chest.            Assessment/Plan:  I anticipate this patient is appropriate for observation status at this time.    * Moderate persistent asthma with acute exacerbation- (present on admission)  Assessment & Plan  -I will start him on IV Solu-Medrol 40 mg twice daily.  I will resume his home dose Symbicort, along with Singulair.  I will start him on xopenex nebulization QID.  -Continue respiratory support with RT protocol, and oxygen supplements as needed to keep saturations above 88%.  -Chest x-ray does not show any acute  infiltrates or consolidation.  Check procalcitonin.  Hold off on antibiotics, unless procalcitonin is elevated.  For completion, I will obtain an ESR and CRP.  Check influenza PCR.  -Consider outpatient skin allergy testing to determine if certain allergens are leading to poorly controlled asthma.    Pleuritic chest pain- (present on admission)  Assessment & Plan  -Likely related to asthma exacerbation, with component of musculoskeletal pain with reproducible chest tenderness on exam.  Doubt cardiac chest pain, as he has had a negative cardiac stress test back in August.  CT angiogram at that time also did not show any PE, or any acute lung pathology.  -Management of asthma exacerbation as above.  -Monitor on telemetry.  Trend troponins.    Hypertension- (present on admission)  Assessment & Plan  -I will resume his home lisinopril.  Monitor blood pressure trend closely, with as needed IV labetalol and Vasotec, and oral clonidine for significant hypertension.    Dyslipidemia- (present on admission)  Assessment & Plan  -Resume home Lipitor.    Type 2 diabetes mellitus (HCC)- (present on admission)  Assessment & Plan  -I will resume his home oral hypoglycemic agents, and put him on sliding scale insulin coverage while in-house. Accu-Cheks before meals and at bedtime. Goal to keep BG between 140-180 per 2019 ADA guidelines.  Watch blood glucose closely especially that he will be on steroids.      VTE prophylaxis: SCD

## 2019-11-06 NOTE — FACE TO FACE
"Face to Face Note  -  Durable Medical Equipment    GULSHAN Mccullough - NPI: [id number]  I certify that this patient is under my care and that they had a durable medical equipment(DME)face to face encounter by myself that meets the physician DME face-to-face encounter requirements with this patient on:    Date of encounter:   Patient:                    MRN:                       YOB: 2020  Manan Ruffin  [id number]  1990     The encounter with the patient was in whole, or in part, for the following medical condition, which is the primary reason for durable medical equipment:  Asthma    I certify that, based on my findings, the following durable medical equipment is medically necessary:  Nebulizer.    HOME O2 Saturation Measurements:(Values must be present for Home Oxygen orders)         ,     ,         My Clinical findings support the need for the above equipment due to:  Hypoxia, Wheezing (Chronic)    Supporting Symptoms: The patient requires supplemental oxygen, as the following interventions have been tried with limited or no improvement: \"Bronchodilators and/or steroid inhalers and \"Oral and/or IV steroids    "

## 2019-11-06 NOTE — PROGRESS NOTES
Report received from RAF Zepeda.  Patient sitting up in bed watching TV with his wife at bedside.   Patient A & O  X 4.  No apparent signs of distress.  Safety precautions in place.  Patient educated to call for assistance.  Will continue to monitor.

## 2019-11-06 NOTE — ASSESSMENT & PLAN NOTE
-I will start him on IV Solu-Medrol 40 mg twice daily.  I will resume his home dose Symbicort, along with Singulair.  I will start him on xopenex nebulization QID.  -Continue respiratory support with RT protocol, and oxygen supplements as needed to keep saturations above 88%.  -Chest x-ray does not show any acute infiltrates or consolidation.  Check procalcitonin.  Hold off on antibiotics, unless procalcitonin is elevated.  For completion, I will obtain an ESR and CRP.  Check influenza PCR.  -Consider outpatient skin allergy testing to determine if certain allergens are leading to poorly controlled asthma.

## 2019-11-06 NOTE — DISCHARGE PLANNING
Agency/Facility Name: [facility]  Spoke To: Abbi  Outcome: Nebulizer approved. To be delivered to patients home today.     RN MAHESH Che notified.

## 2023-10-10 NOTE — PROGRESS NOTES
Bedside report received 0715. POC discussed with pt; Pt denies SOB; Verbalizes improvement overall in breathing; Mild SOB with exertion; maintaining o2 on RA; No o2 supplements required; Pending stress results; all questions answered at this time.    87